# Patient Record
Sex: MALE | Race: WHITE | NOT HISPANIC OR LATINO | Employment: OTHER | URBAN - METROPOLITAN AREA
[De-identification: names, ages, dates, MRNs, and addresses within clinical notes are randomized per-mention and may not be internally consistent; named-entity substitution may affect disease eponyms.]

---

## 2023-06-28 ENCOUNTER — APPOINTMENT (OUTPATIENT)
Dept: CT IMAGING | Facility: CLINIC | Age: 83
End: 2023-06-28
Attending: EMERGENCY MEDICINE
Payer: COMMERCIAL

## 2023-06-28 ENCOUNTER — HOSPITAL ENCOUNTER (EMERGENCY)
Facility: CLINIC | Age: 83
Discharge: HOME | End: 2023-06-29
Attending: EMERGENCY MEDICINE | Admitting: EMERGENCY MEDICINE
Payer: COMMERCIAL

## 2023-06-28 DIAGNOSIS — R42 VERTIGO: ICD-10-CM

## 2023-06-28 LAB
ANION GAP SERPL CALCULATED.3IONS-SCNC: 11 MMOL/L (ref 7–15)
ATRIAL RATE - MUSE: 63 BPM
BASOPHILS # BLD AUTO: 0 10E3/UL (ref 0–0.2)
BASOPHILS NFR BLD AUTO: 0 %
BUN SERPL-MCNC: 25.8 MG/DL (ref 8–23)
CALCIUM SERPL-MCNC: 8.6 MG/DL (ref 8.8–10.2)
CHLORIDE SERPL-SCNC: 107 MMOL/L (ref 98–107)
CREAT SERPL-MCNC: 0.9 MG/DL (ref 0.67–1.17)
DEPRECATED HCO3 PLAS-SCNC: 22 MMOL/L (ref 22–29)
DIASTOLIC BLOOD PRESSURE - MUSE: NORMAL MMHG
EOSINOPHIL # BLD AUTO: 0.1 10E3/UL (ref 0–0.7)
EOSINOPHIL NFR BLD AUTO: 1 %
ERYTHROCYTE [DISTWIDTH] IN BLOOD BY AUTOMATED COUNT: 12.9 % (ref 10–15)
GFR SERPL CREATININE-BSD FRML MDRD: 86 ML/MIN/1.73M2
GLUCOSE SERPL-MCNC: 146 MG/DL (ref 70–99)
HCO3 BLDV-SCNC: 25 MMOL/L (ref 21–28)
HCT VFR BLD AUTO: 41 % (ref 40–53)
HGB BLD-MCNC: 13.4 G/DL (ref 13.3–17.7)
HOLD SPECIMEN: NORMAL
IMM GRANULOCYTES # BLD: 0 10E3/UL
IMM GRANULOCYTES NFR BLD: 0 %
INTERPRETATION ECG - MUSE: NORMAL
LACTATE BLD-SCNC: 1.5 MMOL/L
LYMPHOCYTES # BLD AUTO: 1.7 10E3/UL (ref 0.8–5.3)
LYMPHOCYTES NFR BLD AUTO: 20 %
MCH RBC QN AUTO: 31.6 PG (ref 26.5–33)
MCHC RBC AUTO-ENTMCNC: 32.7 G/DL (ref 31.5–36.5)
MCV RBC AUTO: 97 FL (ref 78–100)
MONOCYTES # BLD AUTO: 0.9 10E3/UL (ref 0–1.3)
MONOCYTES NFR BLD AUTO: 10 %
NEUTROPHILS # BLD AUTO: 5.8 10E3/UL (ref 1.6–8.3)
NEUTROPHILS NFR BLD AUTO: 69 %
NRBC # BLD AUTO: 0 10E3/UL
NRBC BLD AUTO-RTO: 0 /100
P AXIS - MUSE: 66 DEGREES
PCO2 BLDV: 41 MM HG (ref 40–50)
PH BLDV: 7.39 [PH] (ref 7.32–7.43)
PLATELET # BLD AUTO: 180 10E3/UL (ref 150–450)
PO2 BLDV: 37 MM HG (ref 25–47)
POTASSIUM SERPL-SCNC: 4.1 MMOL/L (ref 3.4–5.3)
PR INTERVAL - MUSE: 218 MS
QRS DURATION - MUSE: 96 MS
QT - MUSE: 430 MS
QTC - MUSE: 440 MS
R AXIS - MUSE: 53 DEGREES
RBC # BLD AUTO: 4.24 10E6/UL (ref 4.4–5.9)
SAO2 % BLDV: 69 % (ref 94–100)
SODIUM SERPL-SCNC: 140 MMOL/L (ref 136–145)
SYSTOLIC BLOOD PRESSURE - MUSE: NORMAL MMHG
T AXIS - MUSE: 73 DEGREES
TROPONIN T SERPL HS-MCNC: 11 NG/L
VENTRICULAR RATE- MUSE: 63 BPM
WBC # BLD AUTO: 8.5 10E3/UL (ref 4–11)

## 2023-06-28 PROCEDURE — 83605 ASSAY OF LACTIC ACID: CPT

## 2023-06-28 PROCEDURE — 250N000009 HC RX 250: Performed by: EMERGENCY MEDICINE

## 2023-06-28 PROCEDURE — 250N000011 HC RX IP 250 OP 636: Performed by: EMERGENCY MEDICINE

## 2023-06-28 PROCEDURE — 70450 CT HEAD/BRAIN W/O DYE: CPT | Mod: XS

## 2023-06-28 PROCEDURE — 82310 ASSAY OF CALCIUM: CPT | Performed by: EMERGENCY MEDICINE

## 2023-06-28 PROCEDURE — 85025 COMPLETE CBC W/AUTO DIFF WBC: CPT | Performed by: EMERGENCY MEDICINE

## 2023-06-28 PROCEDURE — 250N000013 HC RX MED GY IP 250 OP 250 PS 637: Performed by: EMERGENCY MEDICINE

## 2023-06-28 PROCEDURE — 36415 COLL VENOUS BLD VENIPUNCTURE: CPT | Performed by: EMERGENCY MEDICINE

## 2023-06-28 PROCEDURE — 93005 ELECTROCARDIOGRAM TRACING: CPT

## 2023-06-28 PROCEDURE — 70498 CT ANGIOGRAPHY NECK: CPT

## 2023-06-28 PROCEDURE — 84484 ASSAY OF TROPONIN QUANT: CPT | Performed by: EMERGENCY MEDICINE

## 2023-06-28 PROCEDURE — 99285 EMERGENCY DEPT VISIT HI MDM: CPT | Mod: 25

## 2023-06-28 PROCEDURE — 70496 CT ANGIOGRAPHY HEAD: CPT

## 2023-06-28 RX ORDER — MECLIZINE HYDROCHLORIDE 25 MG/1
25 TABLET ORAL ONCE
Status: COMPLETED | OUTPATIENT
Start: 2023-06-28 | End: 2023-06-28

## 2023-06-28 RX ORDER — ONDANSETRON 4 MG/1
4 TABLET, ORALLY DISINTEGRATING ORAL EVERY 6 HOURS PRN
Qty: 10 TABLET | Refills: 0 | Status: SHIPPED | OUTPATIENT
Start: 2023-06-28 | End: 2023-06-29

## 2023-06-28 RX ORDER — MECLIZINE HYDROCHLORIDE 25 MG/1
25 TABLET ORAL 3 TIMES DAILY PRN
Qty: 20 TABLET | Refills: 0 | Status: SHIPPED | OUTPATIENT
Start: 2023-06-28 | End: 2023-06-29

## 2023-06-28 RX ORDER — IOPAMIDOL 755 MG/ML
75 INJECTION, SOLUTION INTRAVASCULAR ONCE
Status: COMPLETED | OUTPATIENT
Start: 2023-06-28 | End: 2023-06-28

## 2023-06-28 RX ADMIN — SODIUM CHLORIDE 90 ML: 9 INJECTION, SOLUTION INTRAVENOUS at 20:53

## 2023-06-28 RX ADMIN — MECLIZINE HYDROCHLORIDE 25 MG: 25 TABLET ORAL at 22:05

## 2023-06-28 RX ADMIN — IOPAMIDOL 75 ML: 755 INJECTION, SOLUTION INTRAVENOUS at 20:53

## 2023-06-28 ASSESSMENT — ACTIVITIES OF DAILY LIVING (ADL)
ADLS_ACUITY_SCORE: 37
ADLS_ACUITY_SCORE: 37
ADLS_ACUITY_SCORE: 35

## 2023-06-28 NOTE — ED NOTES
Bed: ED10  Expected date: 6/28/23  Expected time:   Means of arrival:   Comments:  422  83 M dizzy/nausea/vomiting  4037

## 2023-06-28 NOTE — ED TRIAGE NOTES
Patient was at home and went to get the mail. Started feeling dizzy and having nausea and vomiting. Felt unable to stand. EMS was called and brought him in. Hypertensive in the rig. Was given 2.5 of droperidol.

## 2023-06-29 ENCOUNTER — APPOINTMENT (OUTPATIENT)
Dept: MRI IMAGING | Facility: CLINIC | Age: 83
End: 2023-06-29
Attending: EMERGENCY MEDICINE
Payer: COMMERCIAL

## 2023-06-29 VITALS
TEMPERATURE: 97.7 F | OXYGEN SATURATION: 96 % | BODY MASS INDEX: 23.14 KG/M2 | HEART RATE: 63 BPM | SYSTOLIC BLOOD PRESSURE: 152 MMHG | WEIGHT: 200 LBS | RESPIRATION RATE: 16 BRPM | DIASTOLIC BLOOD PRESSURE: 80 MMHG | HEIGHT: 78 IN

## 2023-06-29 PROCEDURE — A9585 GADOBUTROL INJECTION: HCPCS | Performed by: EMERGENCY MEDICINE

## 2023-06-29 PROCEDURE — 255N000002 HC RX 255 OP 636: Performed by: EMERGENCY MEDICINE

## 2023-06-29 PROCEDURE — 70553 MRI BRAIN STEM W/O & W/DYE: CPT

## 2023-06-29 RX ORDER — ONDANSETRON 4 MG/1
4 TABLET, ORALLY DISINTEGRATING ORAL EVERY 6 HOURS PRN
Qty: 10 TABLET | Refills: 0 | Status: SHIPPED | OUTPATIENT
Start: 2023-06-29 | End: 2023-10-09

## 2023-06-29 RX ORDER — MECLIZINE HYDROCHLORIDE 25 MG/1
25 TABLET ORAL 3 TIMES DAILY PRN
Qty: 20 TABLET | Refills: 0 | Status: SHIPPED | OUTPATIENT
Start: 2023-06-29 | End: 2023-10-09

## 2023-06-29 RX ORDER — GADOBUTROL 604.72 MG/ML
9 INJECTION INTRAVENOUS ONCE
Status: COMPLETED | OUTPATIENT
Start: 2023-06-29 | End: 2023-06-29

## 2023-06-29 RX ADMIN — GADOBUTROL 9 ML: 604.72 INJECTION INTRAVENOUS at 00:51

## 2023-06-29 ASSESSMENT — ACTIVITIES OF DAILY LIVING (ADL): ADLS_ACUITY_SCORE: 37

## 2023-06-29 NOTE — ED PROVIDER NOTES
"  History     Chief Complaint:  Nausea, Vomiting, & Diarrhea and Generalized Weakness       The history is provided by the patient and a relative.      Donald Severson is a 83 year old male on Xarelto who presents with nausea and vertigo. The patient reports that today around 1700 he suddenly developed dizziness. He states that he has never experienced similar dizziness. He experiences some nausea associated with this. He did take his dose of Xarelto yesterday evening. He has not had any doses of Xarelto today. He denies any recent falls or head injuries. He denies having had any alcoholic beverages recently. He states that occasionally he experiences pains in his chest but has not experienced any chest pain today. He denies experiencing fever, cough, or abdominal pain. He lives with his wife. He walks independently.    Independent Historian:   Daughter - They report that he occasionally experiences dizziness after standing or sitting up quickly. She notes that earlier today he complained of paresthesias in his toes.    Review of External Notes:   N/A    Medications:        Past Medical History:    Diverticulosis  Ureteral stone   A-fib  Narcolepsy   Prediabetes  Prepatellar bursitis   Glaucoma   Anemia   Basal cell carcinoma    Past Surgical History:  Prostatectomy   Hernia repair (x2)  Foot surgery  Hemorrhoidectomy  Foot surgery  Hernia repair   Prostate surgery  Vasectomy     Physical Exam     Patient Vitals for the past 24 hrs:   BP Temp Temp src Pulse Resp SpO2 Height Weight   06/28/23 2200 (!) 152/72 -- -- 67 15 96 % -- --   06/28/23 2015 (!) 158/70 -- -- 69 -- -- -- --   06/28/23 2005 -- -- -- -- -- -- 1.981 m (6' 6\") 90.7 kg (200 lb)   06/28/23 2004 -- -- -- 96 18 97 % -- --   06/28/23 2000 (!) 157/75 -- -- 57 -- -- -- --   06/28/23 1855 (!) 173/57 97.7  F (36.5  C) Oral -- -- -- -- --        Physical Exam  General: Alert and cooperative with exam. Patient in moderate distress.  Baseline mentation; history " of dementia.  Sitting in bed with eyes closed  Head:  Scalp is NC/AT  Eyes:  No scleral icterus, PERRL, EOMI   ENT:  The external nose and ears are normal. The oropharynx is normal and without erythema; mucus membranes are moist.   Neck:  Normal range of motion without rigidity.  CV:  Regular rate and rhythm  Resp:  Breath sounds are clear bilaterally    Non-labored, no retractions or accessory muscle use  GI:  Abdomen is soft, no distension, no tenderness. No peritoneal signs  MS:  No lower extremity edema   Skin:  Warm and dry, No rash or lesions noted.  Neuro: Oriented and alert no gross motor deficits.    Strength and sensation grossly intact in all 4 extremities.      Cranial nerves 2-12 intact.    GCS: 15     Emergency Department Course   ECG  ECG taken at 1919, ECG read at 1922  Sinus rhythm with 1st degree AV block.   Rate 63 bpm. MN interval 218 ms. QRS duration 96 ms. QT/QTc 430/440 ms. P-R-T axes 66 53 73.     Imaging:  CTA Head Neck with Contrast   Final Result   IMPRESSION:       HEAD CTA:    No high-grade proximal arterial stenosis, aneurysm, or high flow vascular malformation identified.         NECK CTA:   1.  No hemodynamically significant stenosis in the neck vessels.    2.  No evidence for dissection.      Head CT w/o contrast   Final Result   IMPRESSION:   1.  No definite CT findings of acute intracranial process.   2.  No acute intracranial hemorrhage, extra-axial fluid collection, or mass effect identified.   3.  Mild to moderate supratentorial ventriculomegaly disproportionate to degree of sulcal prominence, as described. Please see the body of the report for details.      MR Brain w/o & w Contrast    (Results Pending)      Report per radiology    Laboratory:  Labs Ordered and Resulted from Time of ED Arrival to Time of ED Departure   BASIC METABOLIC PANEL - Abnormal       Result Value    Sodium 140      Potassium 4.1      Chloride 107      Carbon Dioxide (CO2) 22      Anion Gap 11      Urea  Nitrogen 25.8 (*)     Creatinine 0.90      Calcium 8.6 (*)     Glucose 146 (*)     GFR Estimate 86     CBC WITH PLATELETS AND DIFFERENTIAL - Abnormal    WBC Count 8.5      RBC Count 4.24 (*)     Hemoglobin 13.4      Hematocrit 41.0      MCV 97      MCH 31.6      MCHC 32.7      RDW 12.9      Platelet Count 180      % Neutrophils 69      % Lymphocytes 20      % Monocytes 10      % Eosinophils 1      % Basophils 0      % Immature Granulocytes 0      NRBCs per 100 WBC 0      Absolute Neutrophils 5.8      Absolute Lymphocytes 1.7      Absolute Monocytes 0.9      Absolute Eosinophils 0.1      Absolute Basophils 0.0      Absolute Immature Granulocytes 0.0      Absolute NRBCs 0.0     ISTAT GASES LACTATE VENOUS POCT - Abnormal    Lactic Acid POCT 1.5      Bicarbonate Venous POCT 25      O2 Sat, Venous POCT 69 (*)     pCO2 Venous POCT 41      pH Venous POCT 7.39      pO2 Venous POCT 37     TROPONIN T, HIGH SENSITIVITY - Normal    Troponin T, High Sensitivity 11          Emergency Department Course & Assessments:    Interventions:  Medications   iopamidol (ISOVUE-370) solution 75 mL (75 mLs Intravenous $Given 6/28/23 2053)   Saline Flush (90 mLs Intravenous $Given 6/28/23 2053)   meclizine (ANTIVERT) tablet 25 mg (25 mg Oral $Given 6/28/23 2205)        Assessments:  1908 I obtained history and examined the patient.  2154 I rechecked the patient.    Independent Interpretation (X-rays, CTs, rhythm strip):  None    Consultations/Discussion of Management or Tests:  None     Social Determinants of Health affecting care:   None    Disposition:  Care of the patient was transferred to my colleague Dr. Londono pending MRI.     Impression & Plan      Medical Decision Making:  Donald L Severson is a 83 year old male who presents for evaluation of vertigo. The differential diagnosis of vertigo is broad and includes common etiologies such as menieres disease, labyrinthitis, benign positional vertigo, otitis media, etc.  More serious  etiologies considered include central etiologies such as tumor, intracerebral bleed, dissection, ischemic cerebral vascular accident.  Patient was noted to be significantly hypertensive on initial assessment with significant vertigo.  He underwent CT/CTA of the head and neck given anticoagulated status and concern for potential central causes of vertigo; this was unremarkable for acute pathology as noted above.  MRI brain currently pending.  On reevaluation patient does note substantial improvement with interventions as above and clinically appears well.  Labs and EKG without significant acute findings.  If MRI negative and patient's symptoms are well controlled then I feel he can safely be discharged with close outpatient follow-up; prescriptions written for meclizine and Zofran for symptomatic relief.  Patient signed out to my partner Dr. Londono pending MRI results.      Diagnosis:    ICD-10-CM    1. Vertigo  R42              Scribe Disclosure:  Joce JACKSON B.S. am serving as a scribe at 7:18 PM on 6/28/2023 to document services personally performed by Benjamín Thrasher DO based on my observations and the provider's statements to me.        Benjamín Thrasher DO  06/29/23 0007

## 2023-06-29 NOTE — ED PROVIDER NOTES
Assumed care of the patient from Dr. Thrasher pending return of an MRI.  Results are noted below and are unremarkable.  Patient was feeling well and was subsequently discharged home.    MR Brain w/o & w Contrast   Final Result   IMPRESSION:   1.  No acute infarct.    2.  Chronic intracranial changes described above.         CTA Head Neck with Contrast   Final Result   IMPRESSION:       HEAD CTA:    No high-grade proximal arterial stenosis, aneurysm, or high flow vascular malformation identified.         NECK CTA:   1.  No hemodynamically significant stenosis in the neck vessels.    2.  No evidence for dissection.      Head CT w/o contrast   Final Result   IMPRESSION:   1.  No definite CT findings of acute intracranial process.   2.  No acute intracranial hemorrhage, extra-axial fluid collection, or mass effect identified.   3.  Mild to moderate supratentorial ventriculomegaly disproportionate to degree of sulcal prominence, as described. Please see the body of the report for details.           Jere Londono MD  06/29/23 0114

## 2023-10-09 ENCOUNTER — APPOINTMENT (OUTPATIENT)
Dept: CT IMAGING | Facility: CLINIC | Age: 83
End: 2023-10-09
Attending: EMERGENCY MEDICINE
Payer: COMMERCIAL

## 2023-10-09 ENCOUNTER — APPOINTMENT (OUTPATIENT)
Dept: GENERAL RADIOLOGY | Facility: CLINIC | Age: 83
End: 2023-10-09
Attending: EMERGENCY MEDICINE
Payer: COMMERCIAL

## 2023-10-09 ENCOUNTER — HOSPITAL ENCOUNTER (OUTPATIENT)
Facility: CLINIC | Age: 83
Setting detail: OBSERVATION
Discharge: NURSING, REHAB OR LONG-TERM CARE | End: 2023-10-12
Attending: EMERGENCY MEDICINE | Admitting: INTERNAL MEDICINE
Payer: COMMERCIAL

## 2023-10-09 DIAGNOSIS — R53.1 WEAKNESS: ICD-10-CM

## 2023-10-09 DIAGNOSIS — G47.419 PRIMARY NARCOLEPSY WITHOUT CATAPLEXY: ICD-10-CM

## 2023-10-09 DIAGNOSIS — U07.1 COVID-19: ICD-10-CM

## 2023-10-09 DIAGNOSIS — R19.5 LOOSE STOOLS: Primary | ICD-10-CM

## 2023-10-09 DIAGNOSIS — R41.82 ALTERED MENTAL STATUS, UNSPECIFIED ALTERED MENTAL STATUS TYPE: ICD-10-CM

## 2023-10-09 LAB
ALBUMIN UR-MCNC: 10 MG/DL
ANION GAP SERPL CALCULATED.3IONS-SCNC: 11 MMOL/L (ref 7–15)
APPEARANCE UR: CLEAR
APTT PPP: 28 SECONDS (ref 22–38)
ATRIAL RATE - MUSE: 75 BPM
BACTERIA #/AREA URNS HPF: ABNORMAL /HPF
BASO+EOS+MONOS # BLD AUTO: ABNORMAL 10*3/UL
BASO+EOS+MONOS NFR BLD AUTO: ABNORMAL %
BASOPHILS # BLD AUTO: 0 10E3/UL (ref 0–0.2)
BASOPHILS NFR BLD AUTO: 0 %
BILIRUB UR QL STRIP: NEGATIVE
BUN SERPL-MCNC: 24.6 MG/DL (ref 8–23)
CALCIUM SERPL-MCNC: 8.7 MG/DL (ref 8.8–10.2)
CHLORIDE SERPL-SCNC: 103 MMOL/L (ref 98–107)
COLOR UR AUTO: ABNORMAL
CREAT SERPL-MCNC: 0.99 MG/DL (ref 0.67–1.17)
CRP SERPL-MCNC: 11.94 MG/L
D DIMER PPP FEU-MCNC: 1.04 UG/ML FEU (ref 0–0.5)
DEPRECATED HCO3 PLAS-SCNC: 23 MMOL/L (ref 22–29)
DIASTOLIC BLOOD PRESSURE - MUSE: NORMAL MMHG
EGFRCR SERPLBLD CKD-EPI 2021: 77 ML/MIN/1.73M2
EOSINOPHIL # BLD AUTO: 0 10E3/UL (ref 0–0.7)
EOSINOPHIL NFR BLD AUTO: 0 %
ERYTHROCYTE [DISTWIDTH] IN BLOOD BY AUTOMATED COUNT: 12.3 % (ref 10–15)
FLUAV RNA SPEC QL NAA+PROBE: NEGATIVE
FLUBV RNA RESP QL NAA+PROBE: NEGATIVE
GLUCOSE SERPL-MCNC: 111 MG/DL (ref 70–99)
GLUCOSE UR STRIP-MCNC: NEGATIVE MG/DL
HCT VFR BLD AUTO: 39.2 % (ref 40–53)
HGB BLD-MCNC: 12.6 G/DL (ref 13.3–17.7)
HGB UR QL STRIP: NEGATIVE
IMM GRANULOCYTES # BLD: 0 10E3/UL
IMM GRANULOCYTES NFR BLD: 0 %
INR PPP: 1.2 (ref 0.85–1.15)
INTERPRETATION ECG - MUSE: NORMAL
KETONES UR STRIP-MCNC: NEGATIVE MG/DL
LACTATE SERPL-SCNC: 1.1 MMOL/L (ref 0.7–2)
LEUKOCYTE ESTERASE UR QL STRIP: NEGATIVE
LYMPHOCYTES # BLD AUTO: 0.9 10E3/UL (ref 0.8–5.3)
LYMPHOCYTES NFR BLD AUTO: 14 %
MCH RBC QN AUTO: 30.7 PG (ref 26.5–33)
MCHC RBC AUTO-ENTMCNC: 32.1 G/DL (ref 31.5–36.5)
MCV RBC AUTO: 95 FL (ref 78–100)
MONOCYTES # BLD AUTO: 1.3 10E3/UL (ref 0–1.3)
MONOCYTES NFR BLD AUTO: 20 %
NEUTROPHILS # BLD AUTO: 4.1 10E3/UL (ref 1.6–8.3)
NEUTROPHILS NFR BLD AUTO: 66 %
NITRATE UR QL: NEGATIVE
NRBC # BLD AUTO: 0 10E3/UL
NRBC BLD AUTO-RTO: 0 /100
P AXIS - MUSE: 63 DEGREES
PH UR STRIP: 7.5 [PH] (ref 5–7)
PLATELET # BLD AUTO: 228 10E3/UL (ref 150–450)
POTASSIUM SERPL-SCNC: 4.2 MMOL/L (ref 3.4–5.3)
PR INTERVAL - MUSE: 218 MS
QRS DURATION - MUSE: 88 MS
QT - MUSE: 390 MS
QTC - MUSE: 435 MS
R AXIS - MUSE: 60 DEGREES
RBC # BLD AUTO: 4.11 10E6/UL (ref 4.4–5.9)
RBC URINE: <1 /HPF
RSV RNA SPEC NAA+PROBE: NEGATIVE
SARS-COV-2 RNA RESP QL NAA+PROBE: POSITIVE
SODIUM SERPL-SCNC: 137 MMOL/L (ref 135–145)
SP GR UR STRIP: 1.01 (ref 1–1.03)
SYSTOLIC BLOOD PRESSURE - MUSE: NORMAL MMHG
T AXIS - MUSE: 74 DEGREES
TROPONIN T SERPL HS-MCNC: 13 NG/L
UFH PPP CHRO-ACNC: <0.1 IU/ML
UROBILINOGEN UR STRIP-MCNC: NORMAL MG/DL
VENTRICULAR RATE- MUSE: 75 BPM
WBC # BLD AUTO: 6.3 10E3/UL (ref 4–11)
WBC URINE: <1 /HPF

## 2023-10-09 PROCEDURE — 250N000009 HC RX 250: Performed by: EMERGENCY MEDICINE

## 2023-10-09 PROCEDURE — 85730 THROMBOPLASTIN TIME PARTIAL: CPT | Performed by: EMERGENCY MEDICINE

## 2023-10-09 PROCEDURE — 93005 ELECTROCARDIOGRAM TRACING: CPT

## 2023-10-09 PROCEDURE — 70496 CT ANGIOGRAPHY HEAD: CPT

## 2023-10-09 PROCEDURE — 81001 URINALYSIS AUTO W/SCOPE: CPT | Performed by: EMERGENCY MEDICINE

## 2023-10-09 PROCEDURE — 99285 EMERGENCY DEPT VISIT HI MDM: CPT | Mod: 25

## 2023-10-09 PROCEDURE — 85025 COMPLETE CBC W/AUTO DIFF WBC: CPT | Performed by: EMERGENCY MEDICINE

## 2023-10-09 PROCEDURE — 70450 CT HEAD/BRAIN W/O DYE: CPT

## 2023-10-09 PROCEDURE — 87637 SARSCOV2&INF A&B&RSV AMP PRB: CPT | Performed by: EMERGENCY MEDICINE

## 2023-10-09 PROCEDURE — 120N000001 HC R&B MED SURG/OB

## 2023-10-09 PROCEDURE — 71046 X-RAY EXAM CHEST 2 VIEWS: CPT

## 2023-10-09 PROCEDURE — 82310 ASSAY OF CALCIUM: CPT | Performed by: EMERGENCY MEDICINE

## 2023-10-09 PROCEDURE — 36415 COLL VENOUS BLD VENIPUNCTURE: CPT | Performed by: EMERGENCY MEDICINE

## 2023-10-09 PROCEDURE — 85610 PROTHROMBIN TIME: CPT | Performed by: EMERGENCY MEDICINE

## 2023-10-09 PROCEDURE — 85379 FIBRIN DEGRADATION QUANT: CPT | Performed by: INTERNAL MEDICINE

## 2023-10-09 PROCEDURE — 87086 URINE CULTURE/COLONY COUNT: CPT | Performed by: EMERGENCY MEDICINE

## 2023-10-09 PROCEDURE — 85520 HEPARIN ASSAY: CPT | Performed by: EMERGENCY MEDICINE

## 2023-10-09 PROCEDURE — 70498 CT ANGIOGRAPHY NECK: CPT

## 2023-10-09 PROCEDURE — 83605 ASSAY OF LACTIC ACID: CPT | Performed by: EMERGENCY MEDICINE

## 2023-10-09 PROCEDURE — 250N000011 HC RX IP 250 OP 636: Performed by: EMERGENCY MEDICINE

## 2023-10-09 PROCEDURE — 87040 BLOOD CULTURE FOR BACTERIA: CPT | Performed by: EMERGENCY MEDICINE

## 2023-10-09 PROCEDURE — 99222 1ST HOSP IP/OBS MODERATE 55: CPT | Performed by: INTERNAL MEDICINE

## 2023-10-09 PROCEDURE — 86140 C-REACTIVE PROTEIN: CPT | Performed by: INTERNAL MEDICINE

## 2023-10-09 PROCEDURE — 84484 ASSAY OF TROPONIN QUANT: CPT | Performed by: EMERGENCY MEDICINE

## 2023-10-09 RX ORDER — LATANOPROST 50 UG/ML
1 SOLUTION/ DROPS OPHTHALMIC AT BEDTIME
COMMUNITY

## 2023-10-09 RX ORDER — MODAFINIL 200 MG/1
200 TABLET ORAL DAILY PRN
COMMUNITY
End: 2023-10-13

## 2023-10-09 RX ORDER — TIMOLOL MALEATE 5 MG/ML
1 SOLUTION/ DROPS OPHTHALMIC DAILY
COMMUNITY

## 2023-10-09 RX ORDER — IOPAMIDOL 755 MG/ML
67 INJECTION, SOLUTION INTRAVASCULAR ONCE
Status: COMPLETED | OUTPATIENT
Start: 2023-10-09 | End: 2023-10-09

## 2023-10-09 RX ORDER — MODAFINIL 200 MG/1
200 TABLET ORAL DAILY
COMMUNITY
End: 2023-10-13

## 2023-10-09 RX ORDER — DONEPEZIL HYDROCHLORIDE 5 MG/1
10 TABLET, FILM COATED ORAL AT BEDTIME
COMMUNITY

## 2023-10-09 RX ADMIN — IOPAMIDOL 67 ML: 755 INJECTION, SOLUTION INTRAVENOUS at 17:43

## 2023-10-09 RX ADMIN — SODIUM CHLORIDE 100 ML: 9 INJECTION, SOLUTION INTRAVENOUS at 17:44

## 2023-10-09 ASSESSMENT — ACTIVITIES OF DAILY LIVING (ADL)
ADLS_ACUITY_SCORE: 35

## 2023-10-09 NOTE — ED TRIAGE NOTES
pt went to [employer] around 1330 today and he was found confused at 1630 in the parking lot and he was confused

## 2023-10-09 NOTE — CONSULTS
"      [facility]    Stroke Consult Note    Reason for Consult: Stroke Code     Chief Complaint: No chief complaint on file.      HPI  Donald L Severson is a 83 year old male with a history of atrial fibrillation on Xarelto(not taking it for the last for 5 days) and mild cognitive impairment presents to the ED after he had an episode of confusion where he seemed confused as per the daughter who checked on him in the store.  As per the daughter, he went to [employer] around 1:30 PM today on 10/9/2023 and 3 hours later she checked on him and that people and he seemed confused was brought to the ED.  His speech was different as per the daughter but significantly improved after being brought to the ED.      Imaging Findings  CT head-no intracranial hemorrhage or large ischemic infarct  CTA head and neck-no large vessel occlusion    Intravenous Thrombolysis  Not given due to:   - resolution of symptoms and out of window    Endovascular Treatment  Not initiated due to absence of proximal vessel occlusion    Impression   #Encephalopathy    Recommendations  -Recommend MRI brain with and without contrast to rule out any evidence of ischemic stroke  -Work-up for systemic causes of encephalopathy including infection and metabolic causes.    Patient Follow-up     - final recommendation pending work-up    Thank you for this consult. We will continue to follow.      The Stroke Staff is TYRELL Staton MD  Vascular Neurology Fellow    To page me or covering stroke neurology team member, click here: [facility]  Choose \"On Call\" tab at top, then select \"NEUROLOGY/ALL SITES\" from middle drop-down box, press Enter, then look for \"stroke\" or \"telestroke\" for your site.  ______________________________________________________    Clinically Significant Risk Factors Present on Admission                                  Past Medical History   No past medical history on file.  Past " Surgical History   No past surgical history on file.  Medications   Home Meds  Prior to Admission medications    Medication Sig Start Date End Date Taking? Authorizing Provider   meclizine (ANTIVERT) 25 MG tablet Take 1 tablet (25 mg) by mouth 3 times daily as needed for dizziness 6/29/23   Trigger, Jere Catherine MD   ondansetron (ZOFRAN ODT) 4 MG ODT tab Take 1 tablet (4 mg) by mouth every 6 hours as needed for nausea 6/29/23   Trigger, Jere Catherine MD       Scheduled Meds      Infusion Meds      PRN Meds      Allergies   No Known Allergies  Family History   No family history on file.  Social History        Review of Systems   Review of systems was not needed on this patient       PHYSICAL EXAMINATION  Temp:  [101.2  F (38.4  C)] 101.2  F (38.4  C)  Pulse:  [74] 74  Resp:  [18] 18  BP: (154)/(58) 154/58  SpO2:  [96 %] 96 %     Neurologic  Mental Status:  alert, oriented x 3, follows commands, speech clear and fluent, naming and repetition normal  Cranial Nerves:  visual fields intact, PERRL, EOMI with normal smooth pursuit, facial sensation intact and symmetric, facial movements symmetric, hearing not formally tested but intact to conversation, palate elevation symmetric and uvula midline, no dysarthria, shoulder shrug strong bilaterally, tongue protrusion midline  Motor:  normal muscle tone and bulk, no abnormal movements, able to move all limbs spontaneously, strength 5/5 throughout upper and lower extremities, no pronator drift  Reflexes:  toes down-going  Sensory:  light touch sensation intact and symmetric throughout upper and lower extremities, no extinction on double simultaneous stimulation   Coordination:  normal finger-to-nose and heel-to-shin bilaterally without dysmetria, rapid alternating movements symmetric  Station/Gait:  deferred    Dysphagia Screen  Per Nursing    Stroke Scales    NIHSS  1a. Level of Consciousness 0-->Alert, keenly responsive   1b. LOC Questions 0-->Answers both questions  correctly   1c. LOC Commands 0-->Performs both tasks correctly   2.   Best Gaze 0-->Normal   3.   Visual 0-->No visual loss   4.   Facial Palsy 0-->Normal symmetrical movements   5a. Motor Arm, Left 0-->No drift, limb holds 90 (or 45) degrees for full 10 secs   5b. Motor Arm, Right 0-->No drift, limb holds 90 (or 45) degrees for full 10 secs   6a. Motor Leg, Left 0-->No drift, leg holds 30 degree position for full 5 secs   6b. Motor Leg, right 0-->No drift, leg holds 30 degree position for full 5 secs   7.   Limb Ataxia 0-->Absent   8.   Sensory 0-->Normal, no sensory loss   9.   Best Language 0-->No aphasia, normal   10. Dysarthria 0-->Normal   11. Extinction and Inattention  0-->No abnormality   Total 0 (10/09/23 1749)       Modified Whitfield Score (Pre-morbid)    -      Imaging  I personally reviewed all imaging; relevant findings per HPI.     Lab Results Data   CBC  No results for input(s): WBC, RBC, HGB, HCT, PLT in the last 168 hours.  Basic Metabolic Panel    No results for input(s): NA, POTASSIUM, CHLORIDE, CO2, BUN, CR, GLC, RAJENDRA in the last 168 hours.  Liver Panel  No results for input(s): PROTTOTAL, ALBUMIN, BILITOTAL, ALKPHOS, AST, ALT, BILIDIRECT in the last 168 hours.  INR  No lab results found.   Lipid Profile  No lab results found.  A1C  No lab results found.  Troponin  No results for input(s): CTROPT, TROPONINIS, TROPONINI, GHTROP in the last 168 hours.       Stroke Code Data Data   Stroke Code Data  (for stroke code without tele)  Stroke code activated 10/09/23   1735   First stroke provider response         Last known normal 10/09/23   1330   Time of discovery   (or onset of symptoms) 10/09/23   1630   Head CT read by Stroke Neuro Dr/Provider 10/09/23   1754   Was stroke code de-escalated? Yes 10/09/23 1800

## 2023-10-09 NOTE — ED PROVIDER NOTES
"  History     Chief Complaint:  Extremity Weakness    HPI   Donald L Severson is a 83 year old male with a history of glaucoma, basal cell skin infection, atrial fibrillation who presents to the ED via EMS for an evaluation of extremity weakness. Patient went to [employer] at around 1330 today, his family noticed he was gone for a while and was eventually found in the parking lot inside his car confused and unable to speak. Daughter reports the patient recently stopped taking Xarelto because of hematoma. Family noticed his right leg was swollen about 3 weeks ago. He went to the clinic two different times to get it checked because they thought it was clotting. Daughter has been aware of his knee since then. Patient has fever of 101.2 after coming into the ED, before he had no fever and cough.     Independent Historian:   EMS - They report as noted above and Daughter - They report as noted above.     Review of External Notes:   I reviewed a note from [facility] from 10/4/23.     Medications:    Meclizine   Ondansetron  Loperamide HCI  Cholecalciferol   Rivaroxaban   Timolol   Donepezil   Latanoprost     Past Medical History:    Malignant neoplasm of prostate   Glaucoma   Right knee infection   Basal cell skin cancer   Cataract   Atrial fibrillation     Past Surgical History:    Radical prostatectomy   Inguinal hernia repair bilateral   Achilles heel left ankle   Hemorrhoidectomy   Prostate removed   Vasectomy     Physical Exam   Patient Vitals for the past 24 hrs:   BP Temp Temp src Pulse Resp SpO2 Height Weight   10/09/23 2034 (!) 142/86 -- -- 66 18 98 % -- --   10/09/23 1915 (!) 154/62 -- -- 72 16 96 % -- --   10/09/23 1821 -- -- -- -- -- -- 1.981 m (6' 6\") 84.2 kg (185 lb 10 oz)   10/09/23 1815 (!) 149/64 -- -- 76 28 97 % -- --   10/09/23 1800 (!) 152/66 -- -- 81 22 98 % -- --   10/09/23 1739 -- -- -- -- -- -- -- 84.2 kg (185 lb 10 oz)   10/09/23 1736 (!) 154/58 (!) 101.2  F (38.4  C) Temporal 74 18 " 96 % -- 84.2 kg (185 lb 10 oz)      Physical Exam  General: Alert, No distress. Nontoxic appearance  Head: No signs of trauma.   Mouth/Throat: Oropharynx moist.   Eyes: Conjunctivae are normal. Pupils are equal..   Neck: Normal range of motion.    CV: Appears well perfused.  Resp: No respiratory distress.   MSK: Normal range of motion. No obvious deformity.   Neuro: The patient is alert and interactive. JUSTICE. Speech normal. GCS 15.  Slight right-sided facial weakness.  Slight right-sided upper extremity decrease in coordination with finger-nose-finger.  Skin: No lesion or sign of trauma noted.   Psych: normal mood and affect. behavior is normal.      Emergency Department Course   ECG  ECG taken at 1816, ECG read at 1823  Sinus rhythm with 1st degree AV block   Otherwise normal ECG    as compared to prior, dated 06/28/23.  Rate 75 bpm. CA interval 218 ms. QRS duration 88 ms. QT/QTc 390/435 ms. P-R-T axes 63 60 74.     Imaging:  Chest XR,  PA & LAT   Final Result   IMPRESSION: Negative chest.      CTA Head Neck with Contrast   Final Result   IMPRESSION:    HEAD CTA:    1.  No evidence of large vessel occlusion or high-grade stenosis.      NECK CTA:   1.  No evidence of large vessel occlusion or high-grade stenosis.      CT Head w/o Contrast   Final Result   IMPRESSION:   No evidence of hemorrhage. No convincing acute infarct. Volume loss and background of white matter hypoattenuation likely representing chronic small vessel ischemic change. No acute osseous abnormality. Nasal septal defect noted.      Findings were discussed by phone between Dr. Arreola and Dr. White at approximately 5:56 PM on 10/9/2023.         Report per radiology    Laboratory:  Labs Ordered and Resulted from Time of ED Arrival to Time of ED Departure   BASIC METABOLIC PANEL - Abnormal       Result Value    Sodium 137      Potassium 4.2      Chloride 103      Carbon Dioxide (CO2) 23      Anion Gap 11      Urea Nitrogen 24.6 (*)     Creatinine 0.99       GFR Estimate 77      Calcium 8.7 (*)     Glucose 111 (*)    INR - Abnormal    INR 1.20 (*)    CBC WITH PLATELETS AND DIFFERENTIAL - Abnormal    WBC Count 6.3      RBC Count 4.11 (*)     Hemoglobin 12.6 (*)     Hematocrit 39.2 (*)     MCV 95      MCH 30.7      MCHC 32.1      RDW 12.3      Platelet Count 228      % Neutrophils 66      % Lymphocytes 14      % Monocytes 20      Mids % (Monos, Eos, Basos)        % Eosinophils 0      % Basophils 0      % Immature Granulocytes 0      NRBCs per 100 WBC 0      Absolute Neutrophils 4.1      Absolute Lymphocytes 0.9      Absolute Monocytes 1.3      Mids Abs (Monos, Eos, Basos)        Absolute Eosinophils 0.0      Absolute Basophils 0.0      Absolute Immature Granulocytes 0.0      Absolute NRBCs 0.0     ROUTINE UA WITH MICROSCOPIC REFLEX TO CULTURE - Abnormal    Color Urine Light Yellow      Appearance Urine Clear      Glucose Urine Negative      Bilirubin Urine Negative      Ketones Urine Negative      Specific Gravity Urine 1.010      Blood Urine Negative      pH Urine 7.5 (*)     Protein Albumin Urine 10 (*)     Urobilinogen Urine Normal      Nitrite Urine Negative      Leukocyte Esterase Urine Negative      Bacteria Urine Few (*)     RBC Urine <1      WBC Urine <1     INFLUENZA A/B, RSV, & SARS-COV2 PCR - Abnormal    Influenza A PCR Negative      Influenza B PCR Negative      RSV PCR Negative      SARS CoV2 PCR Positive (*)    HEPARIN UNFRACTIONATED ANTI XA LEVEL - Normal    Anti Xa Unfractionated Heparin <0.10     PARTIAL THROMBOPLASTIN TIME - Normal    aPTT 28     TROPONIN T, HIGH SENSITIVITY - Normal    Troponin T, High Sensitivity 13     LACTIC ACID WHOLE BLOOD - Normal    Lactic Acid 1.1     GLUCOSE MONITOR NURSING POCT   URINE CULTURE   BLOOD CULTURE   BLOOD CULTURE      Emergency Department Course & Assessments:    Interventions:  Medications   iopamidol (ISOVUE-370) solution 67 mL (67 mLs Intravenous $Given 10/9/23 0812)   100mL Saline FLush (100 mLs As  instructed $Given 10/9/23 4223)      Assessments:  1731 I obtained history and examined the patient as noted above.   2137 I rechecked and updated the patient. Patient tested positive for COVID.  Independent Interpretation (X-rays, CTs, rhythm strip):  None    Consultations/Discussion of Management or Tests:  1819 I spoke with Dr. Hicks from Neuro.    2210 I spoke with Dr. Shai Gutierrez of the hospitalist service regarding admission.     Social Determinants of Health affecting care:   Stress/Adjustment Disorders    Disposition:  The patient was admitted to the hospital under the care of Dr. Shai Gutierrez    Impression & Plan      Medical Decision Making:  This patient is presenting to the ER with altered mental status and weakness.  Symptoms initially were starting for possible stroke.  Code stroke was called and the patient was evaluated by the stroke team.  However, during the patient's initial evaluation he was found to have a fever.  A search of possible infectious causes revealed the patient has COVID 19 viral infection.  No hypoxia.  No signs of sepsis.  Because of the patient's continued confusion and weakness/gait instability, he will be admitted to the hospital for further evaluation and treatment.    Diagnosis:    ICD-10-CM    1. COVID-19  U07.1       2. Weakness  R53.1       3. Altered mental status, unspecified altered mental status type  R41.82          Critical care time: 60 minutes     Scribe Disclosure:  I, Jeny Humphries, am serving as a scribe at 5:40 PM on 10/9/2023 to document services personally performed by Chago White MD based on my observations and the provider's statements to me.     10/9/2023   Chago White MD Joing, Todd Roger, MD  10/10/23 0048

## 2023-10-10 ENCOUNTER — APPOINTMENT (OUTPATIENT)
Dept: MRI IMAGING | Facility: CLINIC | Age: 83
End: 2023-10-10
Attending: INTERNAL MEDICINE
Payer: COMMERCIAL

## 2023-10-10 ENCOUNTER — APPOINTMENT (OUTPATIENT)
Dept: PHYSICAL THERAPY | Facility: CLINIC | Age: 83
End: 2023-10-10
Attending: INTERNAL MEDICINE
Payer: COMMERCIAL

## 2023-10-10 LAB
ERYTHROCYTE [DISTWIDTH] IN BLOOD BY AUTOMATED COUNT: 12.2 % (ref 10–15)
HCT VFR BLD AUTO: 39.7 % (ref 40–53)
HGB BLD-MCNC: 12.9 G/DL (ref 13.3–17.7)
MCH RBC QN AUTO: 31.1 PG (ref 26.5–33)
MCHC RBC AUTO-ENTMCNC: 32.5 G/DL (ref 31.5–36.5)
MCV RBC AUTO: 96 FL (ref 78–100)
PLATELET # BLD AUTO: 222 10E3/UL (ref 150–450)
RBC # BLD AUTO: 4.15 10E6/UL (ref 4.4–5.9)
WBC # BLD AUTO: 6.4 10E3/UL (ref 4–11)

## 2023-10-10 PROCEDURE — G0378 HOSPITAL OBSERVATION PER HR: HCPCS

## 2023-10-10 PROCEDURE — 85018 HEMOGLOBIN: CPT | Performed by: INTERNAL MEDICINE

## 2023-10-10 PROCEDURE — 250N000009 HC RX 250: Performed by: INTERNAL MEDICINE

## 2023-10-10 PROCEDURE — 70553 MRI BRAIN STEM W/O & W/DYE: CPT

## 2023-10-10 PROCEDURE — 97161 PT EVAL LOW COMPLEX 20 MIN: CPT | Mod: GP

## 2023-10-10 PROCEDURE — 250N000013 HC RX MED GY IP 250 OP 250 PS 637: Performed by: INTERNAL MEDICINE

## 2023-10-10 PROCEDURE — 255N000002 HC RX 255 OP 636: Performed by: INTERNAL MEDICINE

## 2023-10-10 PROCEDURE — 99232 SBSQ HOSP IP/OBS MODERATE 35: CPT | Performed by: INTERNAL MEDICINE

## 2023-10-10 PROCEDURE — 85041 AUTOMATED RBC COUNT: CPT | Performed by: INTERNAL MEDICINE

## 2023-10-10 PROCEDURE — 250N000011 HC RX IP 250 OP 636: Mod: JZ | Performed by: INTERNAL MEDICINE

## 2023-10-10 PROCEDURE — 36415 COLL VENOUS BLD VENIPUNCTURE: CPT | Performed by: INTERNAL MEDICINE

## 2023-10-10 PROCEDURE — 96372 THER/PROPH/DIAG INJ SC/IM: CPT | Mod: XU | Performed by: INTERNAL MEDICINE

## 2023-10-10 PROCEDURE — 97530 THERAPEUTIC ACTIVITIES: CPT | Mod: GP

## 2023-10-10 PROCEDURE — A9585 GADOBUTROL INJECTION: HCPCS | Performed by: INTERNAL MEDICINE

## 2023-10-10 RX ORDER — LATANOPROST 50 UG/ML
1 SOLUTION/ DROPS OPHTHALMIC AT BEDTIME
Status: DISCONTINUED | OUTPATIENT
Start: 2023-10-10 | End: 2023-10-12 | Stop reason: HOSPADM

## 2023-10-10 RX ORDER — ACETAMINOPHEN 650 MG/1
650 SUPPOSITORY RECTAL EVERY 6 HOURS PRN
Status: DISCONTINUED | OUTPATIENT
Start: 2023-10-10 | End: 2023-10-12 | Stop reason: HOSPADM

## 2023-10-10 RX ORDER — MODAFINIL 200 MG/1
200 TABLET ORAL DAILY PRN
Status: DISCONTINUED | OUTPATIENT
Start: 2023-10-10 | End: 2023-10-12 | Stop reason: HOSPADM

## 2023-10-10 RX ORDER — GADOBUTROL 604.72 MG/ML
9 INJECTION INTRAVENOUS ONCE
Status: COMPLETED | OUTPATIENT
Start: 2023-10-10 | End: 2023-10-10

## 2023-10-10 RX ORDER — AMOXICILLIN 250 MG
2 CAPSULE ORAL 2 TIMES DAILY PRN
Status: DISCONTINUED | OUTPATIENT
Start: 2023-10-10 | End: 2023-10-12 | Stop reason: HOSPADM

## 2023-10-10 RX ORDER — AMOXICILLIN 250 MG
1 CAPSULE ORAL 2 TIMES DAILY PRN
Status: DISCONTINUED | OUTPATIENT
Start: 2023-10-10 | End: 2023-10-12 | Stop reason: HOSPADM

## 2023-10-10 RX ORDER — ACETAMINOPHEN 325 MG/1
650 TABLET ORAL EVERY 6 HOURS PRN
Status: DISCONTINUED | OUTPATIENT
Start: 2023-10-10 | End: 2023-10-12 | Stop reason: HOSPADM

## 2023-10-10 RX ORDER — ONDANSETRON 4 MG/1
4 TABLET, ORALLY DISINTEGRATING ORAL EVERY 6 HOURS PRN
Status: DISCONTINUED | OUTPATIENT
Start: 2023-10-10 | End: 2023-10-12 | Stop reason: HOSPADM

## 2023-10-10 RX ORDER — DONEPEZIL HYDROCHLORIDE 5 MG/1
10 TABLET, FILM COATED ORAL AT BEDTIME
Status: DISCONTINUED | OUTPATIENT
Start: 2023-10-10 | End: 2023-10-12 | Stop reason: HOSPADM

## 2023-10-10 RX ORDER — TIMOLOL MALEATE 5 MG/ML
1 SOLUTION/ DROPS OPHTHALMIC DAILY
Status: DISCONTINUED | OUTPATIENT
Start: 2023-10-10 | End: 2023-10-12 | Stop reason: HOSPADM

## 2023-10-10 RX ORDER — POLYETHYLENE GLYCOL 3350 17 G/17G
17 POWDER, FOR SOLUTION ORAL DAILY PRN
Status: DISCONTINUED | OUTPATIENT
Start: 2023-10-10 | End: 2023-10-12 | Stop reason: HOSPADM

## 2023-10-10 RX ORDER — ONDANSETRON 2 MG/ML
4 INJECTION INTRAMUSCULAR; INTRAVENOUS EVERY 6 HOURS PRN
Status: DISCONTINUED | OUTPATIENT
Start: 2023-10-10 | End: 2023-10-12 | Stop reason: HOSPADM

## 2023-10-10 RX ORDER — ENOXAPARIN SODIUM 100 MG/ML
40 INJECTION SUBCUTANEOUS EVERY 24 HOURS
Status: DISCONTINUED | OUTPATIENT
Start: 2023-10-10 | End: 2023-10-11

## 2023-10-10 RX ORDER — MODAFINIL 200 MG/1
200 TABLET ORAL DAILY
Status: DISCONTINUED | OUTPATIENT
Start: 2023-10-10 | End: 2023-10-12 | Stop reason: HOSPADM

## 2023-10-10 RX ADMIN — ENOXAPARIN SODIUM 40 MG: 40 INJECTION SUBCUTANEOUS at 09:18

## 2023-10-10 RX ADMIN — DONEPEZIL HYDROCHLORIDE 10 MG: 5 TABLET, FILM COATED ORAL at 23:11

## 2023-10-10 RX ADMIN — MODAFINIL 200 MG: 200 TABLET ORAL at 13:00

## 2023-10-10 RX ADMIN — TIMOLOL MALEATE 1 DROP: 5 SOLUTION/ DROPS OPHTHALMIC at 13:00

## 2023-10-10 RX ADMIN — GADOBUTROL 9 ML: 604.72 INJECTION INTRAVENOUS at 19:39

## 2023-10-10 RX ADMIN — LATANOPROST 1 DROP: 50 SOLUTION/ DROPS OPHTHALMIC at 23:13

## 2023-10-10 ASSESSMENT — ACTIVITIES OF DAILY LIVING (ADL)
WEAR_GLASSES_OR_BLIND: YES
ADLS_ACUITY_SCORE: 35
ADLS_ACUITY_SCORE: 27
ADLS_ACUITY_SCORE: 35
CONCENTRATING,_REMEMBERING_OR_MAKING_DECISIONS_DIFFICULTY: YES
ADLS_ACUITY_SCORE: 27
ADLS_ACUITY_SCORE: 35
ADLS_ACUITY_SCORE: 35
NUMBER_OF_TIMES_PATIENT_HAS_FALLEN_WITHIN_LAST_SIX_MONTHS: 0
WALKING_OR_CLIMBING_STAIRS_DIFFICULTY: NO
DOING_ERRANDS_INDEPENDENTLY_DIFFICULTY: NO
ADLS_ACUITY_SCORE: 35
ADLS_ACUITY_SCORE: 35
DIFFICULTY_EATING/SWALLOWING: OTHER (SEE COMMENTS)
ADLS_ACUITY_SCORE: 27
ADLS_ACUITY_SCORE: 35
ADLS_ACUITY_SCORE: 33
CHANGE_IN_FUNCTIONAL_STATUS_SINCE_ONSET_OF_CURRENT_ILLNESS/INJURY: NO
DRESSING/BATHING_DIFFICULTY: NO
TOILETING_ISSUES: NO
FALL_HISTORY_WITHIN_LAST_SIX_MONTHS: NO
ADLS_ACUITY_SCORE: 27

## 2023-10-10 NOTE — PROGRESS NOTES
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 Report was that he had gone to [employer] on the day of admission, family noticed he had been gone for awhile and they found him in his car confused and unable to speak. Brought to ED for evaluation  *In ED, was febrile with T 101.2, VS otherwise stable on O2 sats stable on RA. Labs were generally unremarkable, though CRP elevated at 11.9. UA bland and not suggestive of infection. CXR neg. Head CT and CTA head/neck were neg. Was found to be COVID+ on viral swab. Treatment not pursued dt stable respiratory status.   *Condition quickly improved -- mentating appropriately on 10/10, some mild situational confusion persisted (in setting of possible cog impairment as below) but no focal neurologic deficits  -- remains afebrile, blood cultures on admission remain neg  -- clinical picture now seems more suggestive of TIA in setting of having recently stopped his DOAC  -- cont serial neuro checks  -- ongoing workup ordered with MRI brain  -- consider neurology consult pending MRI findings     Possible underlying mild cognitive impairment  *Saw neurology earlier this year, manages with Aricept HS which has been continued.    COVID-19 infection  *With presentation/workup as above.  -- no respiratory sx  -- no indication for specific treatment with steroids or remdesivir in absence of hypoxia    Atrial fibrillation, on chronic anticoagulation with DOAC  Hx of recent RLE hematoma, diagnosed in ED visit on 10/4/23  *Was seen in ED on 10/4/23 for evaluation of RLE swelling, diagnosed  "with a hematoma and was told to stop his Xarelto for 1 wk.   *EKG on admission showed NSR with 1st degree AVB.   -- monitoring on telemetry  -- DOAC held on admission with ongoing evaluation above, placed on Lovenox for DVT ppx  -- hgb stable at 12    Hx narcolepsy  *Chronic and stable on modafinil (takes daily and prn at noon)    Glaucoma  *Chronic and stable on eye drops     FEN: no IVFs, lytes stable, regular diet  DVT Prophylaxis: Lovenox  Code Status: Full Code    Disposition: Anticipate discharge in 1-2d, pending continued improvement and completion of neurology workup as above.     Called daughter Steven this morning, left brief VM (she had been updated per bedside RN earlier today)    Taylor Navarrete, DO    Medical Decision Making       Please see A&P for additional details of medical decision making.       Interval History   Chart reviewed. Seen this morning. Mentation improved! A&Ox3, able to tell me he was going to [employer] yesterday for items he needs to fix stairs. Conversation somewhat tangential. Unable to recall how he got here. No specific complaints this AM, denies cp/sob/cough, abd pain/n/v. Ate breakfast. Able to tell me that his anticoagulation was stopped 1 wk ago because of \"bleeding in my right leg\", I was able to confirm this based on ED records in chart review.     -Data reviewed today: I reviewed all new labs and imaging results over the last 24 hours. I personally reviewed no images or EKG's today.    Physical Exam   Temp: 99.5  F (37.5  C) Temp src: Oral BP: 114/60 Pulse: 74   Resp: 18 SpO2: 95 % O2 Device: None (Room air)    Vitals:    10/09/23 1739 10/09/23 1821 10/10/23 0148   Weight: 84.2 kg (185 lb 10 oz) 84.2 kg (185 lb 10 oz) 83.3 kg (183 lb 10.3 oz)     Vital Signs with Ranges  Temp:  [98.4  F (36.9  C)-101.2  F (38.4  C)] 99.5  F (37.5  C)  Pulse:  [62-81] 74  Resp:  [11-28] 18  BP: (114-157)/(58-86) 114/60  SpO2:  [95 %-98 %] 95 %  I/O last 3 completed shifts:  In: - "   Out: 100 [Urine:100]    Constitutional: Resting comfortably, A&OX3, NAD  Respiratory: CTAB, no wheeze/rales/rhonchi, no increased work of breathing  Cardiovascular: HRRR, no MGR, no LE edema  GI: S, NT, ND, +BS  Skin/Integumen: warm/dry  Other: CNs 2-12 intact, no focal motor/sensory deficits    Medications    - MEDICATION INSTRUCTIONS -        enoxaparin ANTICOAGULANT  40 mg Subcutaneous Q24H       Data   Recent Labs   Lab 10/10/23  0840 10/09/23  1739   WBC 6.4 6.3   HGB 12.9* 12.6*   MCV 96 95    228   INR  --  1.20*   NA  --  137   POTASSIUM  --  4.2   CHLORIDE  --  103   CO2  --  23   BUN  --  24.6*   CR  --  0.99   ANIONGAP  --  11   RAJENDRA  --  8.7*   GLC  --  111*       Recent Results (from the past 24 hour(s))   CT Head w/o Contrast    Narrative    EXAM: CT HEAD W/O CONTRAST  LOCATION: [facility]  DATE: 10/9/2023    INDICATION: Code Stroke to evaluate for potential thrombolysis and thrombectomy. PLEASE READ IMMEDIATELY. Confusion.  COMPARISON: None.  TECHNIQUE: Routine CT Head without IV contrast. Multiplanar reformats. Dose reduction techniques were used.      Impression    IMPRESSION:  No evidence of hemorrhage. No convincing acute infarct. Volume loss and background of white matter hypoattenuation likely representing chronic small vessel ischemic change. No acute osseous abnormality. Nasal septal defect noted.    Findings were discussed by phone between Dr. Arreola and Dr. White at approximately 5:56 PM on 10/9/2023.   CTA Head Neck with Contrast    Narrative    EXAM: CTA HEAD NECK W CONTRAST  LOCATION: [facility]  DATE: 10/9/2023    INDICATION: Code Stroke to evaluate for potential thrombolysis and thrombectomy. PLEASE READ IMMEDIATELY. Confusion.  COMPARISON: CTA of the head and neck 06/28/2023.  CONTRAST: 67 mL Isovue-[id number].  TECHNIQUE: Axial helical CT images of the head and neck vessels obtained during the arterial phase of intravenous contrast  administration. Axial 2D reconstructed images and multiplanar 3D MIP reconstructed images of the head and neck vessels were   performed by the technologist. Dose reduction techniques were used. All stenosis measurements made according to NASCET criteria unless otherwise specified.    FINDINGS:   HEAD CTA:  No evidence of large vessel occlusion, high-grade stenosis, aneurysm, or vascular malformation. Incidental proximal basilar artery fenestration, unchanged. Tiny (2 mm or less) nonspecific outpouchings of the bilateral internal carotid arteries at the   supraclinoid segments, presumably incidental infundibula, unchanged.    NECK CTA:  No evidence of large vessel occlusion, high-grade stenosis, or dissection.    INCIDENTAL FINDINGS: Cervical spine degenerative changes with multiple stenoses. Presumed scarring/atelectasis at the lung apices. Apical calcified pleural plaquing. Nodule at the right lung apex measuring approximately 6 mm in diameter for which   optional outpatient chest CT workup could be pursued if the patient is at high risk for a pulmonary malignancy, unchanged.      Impression    IMPRESSION:   HEAD CTA:   1.  No evidence of large vessel occlusion or high-grade stenosis.    NECK CTA:  1.  No evidence of large vessel occlusion or high-grade stenosis.   Chest XR,  PA & LAT    Narrative    EXAM: XR CHEST 2 VIEWS  LOCATION: [facility]  DATE: 10/9/2023    INDICATION: ams, fever  COMPARISON: None.      Impression    IMPRESSION: Negative chest.

## 2023-10-10 NOTE — PHARMACY-ADMISSION MEDICATION HISTORY
Pharmacist Admission Medication History    Admission medication history is complete. The information provided in this note is only as accurate as the sources available at the time of the update.    Information Source(s): Clinic records, Hospital records, and [facility]A    Pertinent Information: Patient's Xarelto placed on hold 10/4 d/t leg hematoma, initial plan to hold for 1 week    Changes made to PTA medication list:  Added: None  Deleted: None  Changed: None    Medication Affordability:  Not including over the counter (OTC) medications, was there a time in the past 3 months when you did not take your medications as prescribed because of cost?: No    Allergies reviewed with patient and updates made in EHR: no    Medication History Completed By: TORSTEN WILKS RP 10/10/2023 7:42 AM    Prior to Admission medications    Medication Sig Last Dose Taking? Auth Provider Long Term End Date   donepezil (ARICEPT) 5 MG tablet Take 10 mg by mouth at bedtime Past Week Yes Unknown, Entered By History     latanoprost (XALATAN) 0.005 % ophthalmic solution Place 1 drop into both eyes at bedtime Past Week Yes Unknown, Entered By History Yes    modafinil (PROVIGIL) 200 MG tablet Take 200 mg by mouth daily Past Week Yes Unknown, Entered By History     modafinil (PROVIGIL) 200 MG tablet Take 200 mg by mouth daily as needed (at noon for narcolepsy) Past Week Yes Unknown, Entered By History     timolol maleate (TIMOPTIC) 0.5 % ophthalmic solution Place 1 drop into the right eye daily Past Week Yes Unknown, Entered By History     rivaroxaban ANTICOAGULANT (XARELTO) 20 MG TABS tablet Take 20 mg by mouth daily (with dinner) 10/3/2023  Unknown, Entered By History Yes

## 2023-10-10 NOTE — ED NOTES
"[facility]  ED Nurse Handoff Report    ED Chief complaint: Extremity Weakness      ED Diagnosis:   Final diagnoses:   COVID-19   Weakness   Altered mental status, unspecified altered mental status type       Code Status: to be discussed with inpatient MD    Allergies: No Known Allergies    Patient Story: Patient presents to ED via EMS after being in the [employer] parking lot for approx 3 hours Patient was confused upon EMS arrival.   Focused Assessment:  Patient alert and more oriented then first arrival. Weakness when standing. Denies any CP,cough, or other symptoms. COVID +, CT head negative.     Treatments and/or interventions provided: See MAR  Patient's response to treatments and/or interventions: Patient resting in bed with no acute needs at this time.    To be done/followed up on inpatient unit:  See inpatient orders.     Does this patient have any cognitive concerns?:  Patient oriented to person, time, and sometimes place. Has not been impulsive, uses call light as needed.     Activity level - Baseline/Home:  Independent  Activity Level - Current:   Stand with Assist    Patient's Preferred language: English   Needed?: No    Isolation: None and COVID r/o and special precautions  Infection: Not Applicable  COVID +  Patient tested for COVID 19 prior to admission: YES  Bariatric?: No    Vital Signs:   Vitals:    10/09/23 1821 10/09/23 1915 10/09/23 2034 10/09/23 2156   BP:  (!) 154/62 (!) 142/86 (!) 157/62   Pulse:  72 66 64   Resp:  16 18 19   Temp:       TempSrc:       SpO2:  96% 98%    Weight: 84.2 kg (185 lb 10 oz)      Height: 1.981 m (6' 6\")          Cardiac Rhythm:     Was the PSS-3 completed:   Yes  What interventions are required if any?               Family Comments: Daughter will be coming back tomorrow, took patient belongings with her. Number in chart.   OBS brochure/video discussed/provided to patient/family: N/A              Name of person given brochure if not " patient: NA              Relationship to patient: NA    For the majority of the shift this patient's behavior was Green.   Behavioral interventions performed were NA.    ED NURSE PHONE NUMBER: [phone]

## 2023-10-10 NOTE — PROGRESS NOTES
10/10/23 1200   Appointment Info   Signing Clinician's Name / Credentials (PT) Diana Faustin DPT   Rehab Comments (PT) COVID   Quick Adds   Quick Adds Certification   Living Environment   People in Home spouse   Current Living Arrangements house   Home Accessibility stairs to enter home   Number of Stairs, Main Entrance 2   Stair Railings, Main Entrance railings on both sides of stairs   Transportation Anticipated car, drives self   Living Environment Comments Lives in house with wife 2STE, stairs down to basement   Self-Care   Usual Activity Tolerance moderate   Current Activity Tolerance moderate   Regular Exercise No   Equipment Currently Used at Home cane, straight   Fall history within last six months no   Activity/Exercise/Self-Care Comment IND w/ all ADLs, drives himself and does not use an AD   General Information   Onset of Illness/Injury or Date of Surgery 10/09/23   Referring Physician Shai Gutierrez MD   Patient/Family Therapy Goals Statement (PT) Go home   Pertinent History of Current Problem (include personal factors and/or comorbidities that impact the POC) Donald L Severson is a 83 year old male with PMHx of afib on anticoagulation with DOAC, narcolepsy, possible mild cog impairment who was admitted on 10/9/2023 with suspected infectious encephalopathy dt COVID19 associated infection   Existing Precautions/Restrictions fall   Cognition   Affect/Mental Status (Cognition) WFL   Orientation Status (Cognition) oriented x 4   Follows Commands (Cognition) WFL   Pain Assessment   Patient Currently in Pain No   Integumentary/Edema   Integumentary/Edema no deficits were identifed   Posture    Posture Forward head position   Range of Motion (ROM)   Range of Motion ROM is WFL   Strength (Manual Muscle Testing)   Strength (Manual Muscle Testing) Deficits observed during functional mobility;strength is WFL   Bed Mobility   Comment, (Bed Mobility) NT d/t pt in chair upon arrival urgently needing  bathroom   Transfers   Comment, (Transfers) sit<>stand SBA FWW   Gait/Stairs (Locomotion)   Comment, (Gait/Stairs) Amb 15ft FWW SBA   Balance   Balance Comments Standing FWW SBA   Clinical Impression   Criteria for Skilled Therapeutic Intervention Yes, treatment indicated   PT Diagnosis (PT) Decreased activity tolerance   Influenced by the following impairments Confusion, decreased strength   Functional limitations due to impairments impaired mobility   Clinical Presentation (PT Evaluation Complexity) Stable/Uncomplicated   Clinical Presentation Rationale clin judgement   Clinical Decision Making (Complexity) low complexity   Planned Therapy Interventions (PT) balance training;bed mobility training;gait training;home exercise program;stair training;strengthening;stretching;transfer training   Anticipated Equipment Needs at Discharge (PT) walker, rolling   Risk & Benefits of therapy have been explained evaluation/treatment results reviewed;care plan/treatment goals reviewed;risks/benefits reviewed;current/potential barriers reviewed;participants voiced agreement with care plan;participants included;patient   PT Total Evaluation Time   PT Eval, Low Complexity Minutes (58460) 15   Therapy Certification   Start of care date 10/10/23   Certification date from 10/10/23   Certification date to 10/21/23   Physical Therapy Goals   PT Frequency 5x/week   PT Predicted Duration/Target Date for Goal Attainment 10/17/23   PT Goals Bed Mobility;Transfers;Gait;Stairs   PT: Bed Mobility Independent;Supine to/from sit;Rolling;Bridging   PT: Transfers Independent;Sit to/from stand;Bed to/from chair;Assistive device   PT: Gait Independent;Greater than 200 feet   PT: Stairs Independent;2 stairs;Rail on both sides   Interventions   Interventions Quick Adds Therapeutic Activity   Therapeutic Activity   Therapeutic Activities: dynamic activities to improve functional performance Minutes (99513) 45   Symptoms Noted During/After Treatment  None   Treatment Detail/Skilled Intervention Pt attempting to get out of chair upon PT arrival, alarm going off. Sit<>stand SBA, pt had large loose stool  in chair. Amb 15ft FWW SBA to toilet with patient continuing to have loose stool on the floor. Pt completed several sit<>stands SBA w/ FWW during pericares. Significant time in room spent completing pericares. Pt handed off to nursing assistant on toilet as pt continued to have loose stool and appeared to require additonal non therapeutic clean up.   PT Discharge Planning   PT Plan progress amb in room, DGI or other balance assessment   PT Discharge Recommendation (DC Rec) Transitional Care Facility;home with assist   PT Rationale for DC Rec Pt appears below functional baseline though eval was limited by pericares. Pt requires SBA w/ FWW, PLOF IND w/o AD. Until further is know about supervision level at home, recommend TCU. If SBA is available, pending stairs then pt can return home with HCPT.   PT Brief overview of current status SBA FWW, total A for ravi cares   Total Session Time   Timed Code Treatment Minutes 45   Total Session Time (sum of timed and untimed services) 60     [facility]  OUTPATIENT PHYSICAL THERAPY EVALUATION  PLAN OF TREATMENT FOR OUTPATIENT REHABILITATION  (COMPLETE FOR INITIAL CLAIMS ONLY)  Patient's Last Name, First Name, M.I.  YOB: 1940  Severson,Donald L                        Provider's Name  [facility] Medical Record No.  [id number]                             Onset Date:  10/09/23   Start of Care Date:  10/10/23   Type:     _X_PT   ___OT   ___SLP Medical Diagnosis:                 PT Diagnosis:  Decreased activity tolerance Visits from SOC:  1     See note for plan of treatment, functional goals and certification details    I CERTIFY THE NEED FOR THESE SERVICES FURNISHED UNDER        THIS PLAN OF TREATMENT AND WHILE UNDER MY CARE     (Physician co-signature  of this document indicates review and certification of the therapy plan).

## 2023-10-10 NOTE — PLAN OF CARE
Summary: Donald L Severson is a 83 year old male admitted on 10/9/2023. He presents with altered mental status  DATE & TIME: 10/10/23 1575-5929    Cognitive Concerns/ Orientation : A&O x4, forgetful at times,   Neuro (q4hr): intact  BEHAVIOR & AGGRESSION TOOL COLOR: Green   ABNL VS/O2: VSS on RA. No SOB reported. Infrequent, dry cough  Tele: A-fib CVR  MOBILITY: Ax1 GB/Walker  PAIN MANAGMENT: Denies  DIET: Regular-good appetite   BOWEL/BLADDER: Cont bladder; uses urinal. Incont bowel at times, urgency and diarrhea.   ABNL LAB/BG: Covid (+), UC-pending  DRAIN/DEVICES: PIV SL  TELEMETRY RHYTHM: SR  SKIN: Scattered bruises, scab  TESTS/PROCEDURES: MRI needed- not completed yet  D/C DAY/GOALS/PLACE: Pending improvement 1-2 days home w/ assist vs TCU   OTHER IMPORTANT INFO: PT following.  - MRI to rule out TIA  - no indication for remdesivir, dexamethasone at this time.

## 2023-10-10 NOTE — PROGRESS NOTES
RECEIVING UNIT ED HANDOFF REVIEW    ED Nurse Handoff Report was reviewed by: Hien Grace RN on October 10, 2023 at 12:51 AM       Offered and provided

## 2023-10-10 NOTE — PLAN OF CARE
Goal Outcome Evaluation:       Summary: Donald L Severson is a 83 year old male admitted on 10/9/2023. He presents with altered mental status  DATE & TIME: 10/10/23 5023-2330    Cognitive Concerns/ Orientation : A&O x2-3, [place]  BEHAVIOR & AGGRESSION TOOL COLOR: Green   ABNL VS/O2: VSS RA  MOBILITY: Ax1 GB/W  PAIN MANAGMENT: Denies  DIET: Regular  BOWEL/BLADDER: Continent, urinal in bed   ABNL LAB/BG: Covid Positive, INR 1.20  DRAIN/DEVICES: PIV SL  TELEMETRY RHYTHM: SR  SKIN: Scattered bruises, scab  TESTS/PROCEDURES: MRI  D/C DAY/GOALS/PLACE: Pending improvement  OTHER IMPORTANT INFO: pt is alert but confused, unable to do the MRI checklist, family will be here in the morning,blood &  urin cultures pending  - MRI to complete encephalopathy/ CVA workup  - no indication for remdesivir, dexamethasone at this time. Pt is on telemetry and q4 neuro checks

## 2023-10-10 NOTE — H&P
[facility]    History and Physical - Hospitalist Service       Date of Admission:  10/9/2023    Assessment & Plan      Donald L Severson is a 83 year old male admitted on 10/9/2023. He presents with altered mental status    Note: pt confused and unable to obtain history. Attempt[facility]    COVID-19 pneumonia  Encephalopathy, suspect infectious  Pt went to [employer] today, family noted he was gone awhile and found him in his car confused and unable to speak. In ED temp 101.2, O2 sats normal. Labs generally normal. CXR w/o acute process. CT head w/o acute process, CTA w/o significant narrowing.   - telemetry   - q4 neuro checks  - supplemental O2 prn  - blood cultures pending  - urine cx pending  - MRI to complete encephalopathy/ CVA workup  - no indication for remdesivir, dexamethasone  - CRP, d-dimer pending  - lovenox ppx  - consider neuro consult pending MRI, progress    Atrial fibrillation  - hold xarelto or restart with rec and hold lovenox    Hx narcolepsy  - resume provigil with rec    Possible mild cognitive impairment  Saw neurology earlier this year for this.        Diet: NPO for Medical/Clinical Reasons Except for: Meds    DVT Prophylaxis: DOAC  Martinez Catheter: Not present  Lines: None     Cardiac Monitoring: None  Code Status:  Full by default, clarify when mental status more clear    Clinically Significant Risk Factors Present on Admission               # Coagulation Defect: INR = 1.20 (Ref range: 0.85 - 1.15) and/or PTT = 28 Seconds (Ref range: 22 - 38 Seconds), will monitor for bleeding                    Disposition Plan           Shai Gutierrez MD  Hospitalist Service  [facility]  Securely message with [employer] (more info)  Text page via [employer] Paging/Directory     ______________________________________________________________________    Chief Complaint   Altered mental status    History is obtained from the  patient, electronic health record, and emergency department physician    History of Present Illness   Donald L Severson is a 83 year old male who presents with confusion.  History is very limited as the patient remains confused and unable to call family at this time.  Patient reportedly went to [employer] earlier this afternoon and his family noticed he had been gone a long time.  He eventually found him in the parking lot inside his car confused and unable to speak.  There was also some concern about his leg being swollen as well as a reported hematoma causing him to hold his Xarelto.  Patient currently denies complaints.  He denies any chest pain or shortness of breath.  Denies any abdominal pain.  He is not able to recount the events of today.      Past Medical History    Afib  narcolepsy    Past Surgical History   No past surgical history on file.    Prior to Admission Medications   Prior to Admission Medications   Prescriptions Last Dose Informant Patient Reported? Taking?   meclizine (ANTIVERT) 25 MG tablet   No No   Sig: Take 1 tablet (25 mg) by mouth 3 times daily as needed for dizziness   ondansetron (ZOFRAN ODT) 4 MG ODT tab   No No   Sig: Take 1 tablet (4 mg) by mouth every 6 hours as needed for nausea      Facility-Administered Medications: None        Review of Systems    Review of systems not obtained due to patient factors - confusion     Physical Exam   Vital Signs: Temp: (!) 101.2  F (38.4  C) Temp src: Temporal BP: (!) 142/86 Pulse: 66   Resp: 18 SpO2: 98 % O2 Device: None (Room air)    Weight: 185 lbs 10.04 oz    General Appearance: Alert, pleasant, confused, not oriented  Respiratory: clear bilaterally  Cardiovascular: RRR, no murmur. No edema  GI: soft, nt/nd  Skin: no rashes or lesions grossly  .  Other: CN grossly intact, JUSTICE      Medical Decision Making       60 MINUTES SPENT BY ME on the date of service doing chart review, history, exam, documentation & further activities per the note.       Data     I have personally reviewed the following data over the past 24 hrs:    6.3  \   12.6 (L)   / 228     137 103 24.6 (H) /  111 (H)   4.2 23 0.99 \     Trop: 13 BNP: N/A     Procal: N/A CRP: 11.94 (H) Lactic Acid: 1.1       INR:  1.20 (H) PTT:  28   D-dimer:  1.04 (H) Fibrinogen:  N/A       Imaging results reviewed over the past 24 hrs:   Recent Results (from the past 24 hour(s))   CT Head w/o Contrast    Narrative    EXAM: CT HEAD W/O CONTRAST  LOCATION: [facility]  DATE: 10/9/2023    INDICATION: Code Stroke to evaluate for potential thrombolysis and thrombectomy. PLEASE READ IMMEDIATELY. Confusion.  COMPARISON: None.  TECHNIQUE: Routine CT Head without IV contrast. Multiplanar reformats. Dose reduction techniques were used.      Impression    IMPRESSION:  No evidence of hemorrhage. No convincing acute infarct. Volume loss and background of white matter hypoattenuation likely representing chronic small vessel ischemic change. No acute osseous abnormality. Nasal septal defect noted.    Findings were discussed by phone between Dr. Arreola and Dr. White at approximately 5:56 PM on 10/9/2023.   CTA Head Neck with Contrast    Narrative    EXAM: CTA HEAD NECK W CONTRAST  LOCATION: [facility]  DATE: 10/9/2023    INDICATION: Code Stroke to evaluate for potential thrombolysis and thrombectomy. PLEASE READ IMMEDIATELY. Confusion.  COMPARISON: CTA of the head and neck 06/28/2023.  CONTRAST: 67 mL [id number].  TECHNIQUE: Axial helical CT images of the head and neck vessels obtained during the arterial phase of intravenous contrast administration. Axial 2D reconstructed images and multiplanar 3D MIP reconstructed images of the head and neck vessels were   performed by the technologist. Dose reduction techniques were used. All stenosis measurements made according to NASCET criteria unless otherwise specified.    FINDINGS:   HEAD CTA:  No evidence of large vessel  occlusion, high-grade stenosis, aneurysm, or vascular malformation. Incidental proximal basilar artery fenestration, unchanged. Tiny (2 mm or less) nonspecific outpouchings of the bilateral internal carotid arteries at the   supraclinoid segments, presumably incidental infundibula, unchanged.    NECK CTA:  No evidence of large vessel occlusion, high-grade stenosis, or dissection.    INCIDENTAL FINDINGS: Cervical spine degenerative changes with multiple stenoses. Presumed scarring/atelectasis at the lung apices. Apical calcified pleural plaquing. Nodule at the right lung apex measuring approximately 6 mm in diameter for which   optional outpatient chest CT workup could be pursued if the patient is at high risk for a pulmonary malignancy, unchanged.      Impression    IMPRESSION:   HEAD CTA:   1.  No evidence of large vessel occlusion or high-grade stenosis.    NECK CTA:  1.  No evidence of large vessel occlusion or high-grade stenosis.   Chest XR,  PA & LAT    Narrative    EXAM: XR CHEST 2 VIEWS  LOCATION: [facility]  DATE: 10/9/2023    INDICATION: ams, fever  COMPARISON: None.      Impression    IMPRESSION: Negative chest.

## 2023-10-10 NOTE — PLAN OF CARE
Goal Outcome Evaluation:       Summary: Donald L Severson is a 83 year old male admitted on 10/9/2023. He presents with altered mental status  DATE & TIME: 10/10/23 [id number]    Cognitive Concerns/ Orientation : A&O x4, [place], forgetful, neuro's intact   BEHAVIOR & AGGRESSION TOOL COLOR: Green   ABNL VS/O2: VSS RA  MOBILITY: Ax1 GB/Walker, ambulated in halls, showered   PAIN MANAGMENT: Denies  DIET: Regular-good appetite   BOWEL/BLADDER: Incontinent stool x1, urinal in bedside   ABNL LAB/BG: Covid Positive, INR 1.20, Hemoglobin 12.9  DRAIN/DEVICES: PIV SL  TELEMETRY RHYTHM: SR  SKIN: Scattered bruises, scab  TESTS/PROCEDURES: MRI needs to be done still today- checklist sent to MRI   D/C DAY/GOALS/PLACE: Pending improvement 1-2 days   OTHER IMPORTANT INFO: Urine culture pending.  - MRI to rule out TIA  - no indication for remdesivir, dexamethasone at this time. Pt is on telemetry and q4 neuro checks

## 2023-10-11 ENCOUNTER — APPOINTMENT (OUTPATIENT)
Dept: PHYSICAL THERAPY | Facility: CLINIC | Age: 83
End: 2023-10-11
Payer: COMMERCIAL

## 2023-10-11 LAB
BACTERIA UR CULT: NORMAL
ERYTHROCYTE [DISTWIDTH] IN BLOOD BY AUTOMATED COUNT: 12.3 % (ref 10–15)
HCT VFR BLD AUTO: 38.8 % (ref 40–53)
HGB BLD-MCNC: 12.7 G/DL (ref 13.3–17.7)
MCH RBC QN AUTO: 31.1 PG (ref 26.5–33)
MCHC RBC AUTO-ENTMCNC: 32.7 G/DL (ref 31.5–36.5)
MCV RBC AUTO: 95 FL (ref 78–100)
PLATELET # BLD AUTO: 201 10E3/UL (ref 150–450)
RBC # BLD AUTO: 4.08 10E6/UL (ref 4.4–5.9)
WBC # BLD AUTO: 4.8 10E3/UL (ref 4–11)

## 2023-10-11 PROCEDURE — 96372 THER/PROPH/DIAG INJ SC/IM: CPT | Performed by: INTERNAL MEDICINE

## 2023-10-11 PROCEDURE — 250N000011 HC RX IP 250 OP 636: Mod: JZ | Performed by: INTERNAL MEDICINE

## 2023-10-11 PROCEDURE — 99232 SBSQ HOSP IP/OBS MODERATE 35: CPT | Performed by: INTERNAL MEDICINE

## 2023-10-11 PROCEDURE — 85027 COMPLETE CBC AUTOMATED: CPT | Performed by: INTERNAL MEDICINE

## 2023-10-11 PROCEDURE — 97530 THERAPEUTIC ACTIVITIES: CPT | Mod: GP

## 2023-10-11 PROCEDURE — 250N000013 HC RX MED GY IP 250 OP 250 PS 637: Performed by: INTERNAL MEDICINE

## 2023-10-11 PROCEDURE — 97116 GAIT TRAINING THERAPY: CPT | Mod: GP

## 2023-10-11 PROCEDURE — 97750 PHYSICAL PERFORMANCE TEST: CPT | Mod: GP

## 2023-10-11 PROCEDURE — G0378 HOSPITAL OBSERVATION PER HR: HCPCS

## 2023-10-11 PROCEDURE — 36415 COLL VENOUS BLD VENIPUNCTURE: CPT | Performed by: INTERNAL MEDICINE

## 2023-10-11 RX ORDER — LOPERAMIDE HCL 2 MG
2 CAPSULE ORAL 4 TIMES DAILY PRN
Status: DISCONTINUED | OUTPATIENT
Start: 2023-10-11 | End: 2023-10-12 | Stop reason: HOSPADM

## 2023-10-11 RX ORDER — LOPERAMIDE HCL 2 MG
2 CAPSULE ORAL 4 TIMES DAILY PRN
DISCHARGE
Start: 2023-10-11

## 2023-10-11 RX ADMIN — LATANOPROST 1 DROP: 50 SOLUTION/ DROPS OPHTHALMIC at 21:38

## 2023-10-11 RX ADMIN — RIVAROXABAN 20 MG: 20 TABLET, FILM COATED ORAL at 17:27

## 2023-10-11 RX ADMIN — TIMOLOL MALEATE 1 DROP: 5 SOLUTION/ DROPS OPHTHALMIC at 09:33

## 2023-10-11 RX ADMIN — MODAFINIL 200 MG: 200 TABLET ORAL at 09:31

## 2023-10-11 RX ADMIN — DONEPEZIL HYDROCHLORIDE 10 MG: 5 TABLET, FILM COATED ORAL at 21:38

## 2023-10-11 RX ADMIN — ENOXAPARIN SODIUM 40 MG: 40 INJECTION SUBCUTANEOUS at 09:34

## 2023-10-11 ASSESSMENT — ACTIVITIES OF DAILY LIVING (ADL)
DEPENDENT_IADLS:: CLEANING;COOKING;LAUNDRY;MEAL PREPARATION
ADLS_ACUITY_SCORE: 31
ADLS_ACUITY_SCORE: 35
ADLS_ACUITY_SCORE: 35
ADLS_ACUITY_SCORE: 33
ADLS_ACUITY_SCORE: 35
ADLS_ACUITY_SCORE: 33
ADLS_ACUITY_SCORE: 35
ADLS_ACUITY_SCORE: 31
ADLS_ACUITY_SCORE: 35
ADLS_ACUITY_SCORE: 31

## 2023-10-11 NOTE — PLAN OF CARE
Goal Outcome Evaluation: Transient encephalopathy, now resolved, with concern for TIA Possible underlying mild cognitive impairment        Orientation: COVID-19 infection A/O x4 alert cooperative, walked with to the bathroom with me and his daughter, will be here soon so they can discuss with his discharge or TCU  Denies pain      Vitals: VSS on R A  pt on Tele with R NS  Neuro Intact CMS checks are discontinues     IV Access/drains: PIV SL    Diet:  Regular with a great appetite     Mobility: Ax1 with G,B/W    GI/: Continent/Incontinent     Wound/Skin: Scattered bruises, scabs     Consults: PT. For general weakness     Discharge Plan: TBD      See Flow sheets for assessment

## 2023-10-11 NOTE — PLAN OF CARE
Goal Outcome Evaluation:       Summary: Donald L Severson is a 83 year old male admitted on 10/9/2023. He presents with altered mental status  DATE & TIME: 10/10/23 [id number]    Cognitive Concerns/ Orientation : A&O x4, forgetful at times,   Neuro (q4hr): intact  BEHAVIOR & AGGRESSION TOOL COLOR: Green   ABNL VS/O2: VSS on RA. No SOB reported. Infrequent, dry cough  Tele: A-fib CVR  MOBILITY: Ax1 GB/Walker  PAIN MANAGMENT: Denies  DIET: Regular-good appetite   BOWEL/BLADDER: Cont bladder; uses urinal. Incont bowel at times, urgency and diarrhea.   ABNL LAB/BG: Covid (+), UC-pending  DRAIN/DEVICES: PIV SL  TELEMETRY RHYTHM: SR  SKIN: Scattered bruises, scab  TESTS/PROCEDURES: MRI  completed yesterday  D/C DAY/GOALS/PLACE: Pending improvement 1-2 days home w/ assist vs TCU   OTHER IMPORTANT INFO: PT following.  - MRI to rule out TIA  - no indication for remdesivir, dexamethasone at this time.

## 2023-10-11 NOTE — PROGRESS NOTES
[facility]    Hospitalist Progress Note    Assessment & Plan   Donald L Severson is a 83 year old male with PMHx of afib on anticoagulation with DOAC, narcolepsy, possible mild cog impairment who was admitted on 10/9/2023 with suspected infectious encephalopathy dt COVID19 associated infection      Transient encephalopathy, now resolved, with concern for TIA vs dt heat exposure  *Limited HPI available on admission as patient was confused. Report was that he had gone to [employer] on the day of admission, family noticed he had been gone for awhile and they found him in his car confused and unable to speak. The heat was on and the car felt warm. Brought to ED for evaluation  *In ED, was febrile with T 101.2, VS otherwise stable on O2 sats stable on RA. Labs were generally unremarkable, though CRP elevated at 11.9. UA bland and not suggestive of infection (UC later grew urogenital cathy). CXR neg. Code stroke was called. Head CT and CTA head/neck were neg. Was found to be COVID+ on viral swab. Treatment not pursued dt stable respiratory status.   *Condition quickly improved -- mentating appropriately on 10/10, some mild situational confusion persisted (in setting of possible cog impairment as below) but no focal neurologic deficits.   *MRI brain neg for infarct.   *Clinical picture suggestive of TIA in setting of recently holding his anticoagulation for RLE hematoma.   -- remains afebrile, blood cultures on admission remain neg  -- resume DOAC     Possible underlying mild cognitive impairment  *Saw neurology earlier this year, manages with Aricept HS which has been continued.    COVID-19 infection  *With presentation/workup as above.  -- no respiratory sx  -- no indication for specific treatment with steroids or remdesivir in absence of hypoxia    Atrial fibrillation, on chronic anticoagulation with DOAC  Hx of recent RLE hematoma, diagnosed in ED visit on 10/4/23  *Was seen in ED on 10/4/23  for evaluation of RLE swelling, diagnosed with a hematoma and was told to stop his Xarelto for 1 wk.   *EKG on admission showed NSR with 1st degree AVB.   -- monitoring on telemetry  -- DOAC resumed on 10/11 as above  -- hgb stable at 12    Hx narcolepsy  *Chronic and stable on modafinil (takes daily and prn at noon)    Glaucoma  *Chronic and stable on eye drops     FEN: no IVFs, lytes stable, regular diet  DVT Prophylaxis: DOAC  Code Status: Full Code    Disposition: Medical issues stable. PT recs yesterday were for discharge to TCU vs home with assist. SW/CC following. Spoke with patient's daughter Steven this morning. She will be present at PT session today to help discern if she can help meet needs at home or if patient will need TCU stay. Discharge later today (if okay for home w/assist and/or home PT) vs tomorrow (if TCU stay needed).    1635 Addendum:  Discussed with PT after session this afternoon. Still recommend TCU stay. Daughter Steven at bedside and in agreement with TCU. CC/SW updated. Will get referrals out in AM    Taylor Navarrete, DO    Medical Decision Making       Please see A&P for additional details of medical decision making.       Interval History   Chart reviewed. Seen this morning. Remains alert and mentating appropriately. Conversation still tangential at times. Denies respiratory complaints, no sob/cough, abd pain/n/v. Inquiring about going home today. Expressed understanding in regards to why anticoagulation should be resumed.    -Data reviewed today: I reviewed all new labs and imaging results over the last 24 hours. I personally reviewed no images or EKG's today.    Physical Exam   Temp: 97.9  F (36.6  C) Temp src: Oral BP: 132/63 Pulse: 55   Resp: 16 SpO2: 96 % O2 Device: None (Room air)    Vitals:    10/09/23 1739 10/09/23 1821 10/10/23 0148   Weight: 84.2 kg (185 lb 10 oz) 84.2 kg (185 lb 10 oz) 83.3 kg (183 lb 10.3 oz)     Vital Signs with Ranges  Temp:  [97.7  F (36.5  C)-98.9   F (37.2  C)] 97.9  F (36.6  C)  Pulse:  [55-63] 55  Resp:  [16-18] 16  BP: (103-134)/(51-70) 132/63  SpO2:  [95 %-97 %] 96 %  I/O last 3 completed shifts:  In: -   Out: 550 [Urine:550]    Constitutional: Resting comfortably, A&Ox3, NAD  Respiratory: CTAB, no wheeze/rales/rhonchi, no increased work of breathing  Cardiovascular: HR irreg, no MGR, no LE edema  GI: S, NT, ND, +BS  Skin/Integumen: warm/dry  Other: CNs 2-12 intact, no focal motor/sensory deficits    Medications    - MEDICATION INSTRUCTIONS -        donepezil  10 mg Oral At Bedtime    enoxaparin ANTICOAGULANT  40 mg Subcutaneous Q24H    latanoprost  1 drop Both Eyes At Bedtime    modafinil  200 mg Oral Daily    timolol maleate  1 drop Right Eye Daily       Data   Recent Labs   Lab 10/11/23  0920 10/10/23  0840 10/09/23  1739   WBC 4.8 6.4 6.3   HGB 12.7* 12.9* 12.6*   MCV 95 96 95    222 228   INR  --   --  1.20*   NA  --   --  137   POTASSIUM  --   --  4.2   CHLORIDE  --   --  103   CO2  --   --  23   BUN  --   --  24.6*   CR  --   --  0.99   ANIONGAP  --   --  11   RAJENDRA  --   --  8.7*   GLC  --   --  111*       Recent Results (from the past 24 hour(s))   MR Brain w/o & w Contrast    Narrative    EXAM: MR BRAIN W/O and W CONTRAST  LOCATION: [facility]  DATE: 10/10/2023    INDICATION: encephalopathy, concern for CVA  COMPARISON: 10/09/2023, 06/29/2023  CONTRAST: 9 mL Gadavist  TECHNIQUE: Routine multiplanar multisequence head MRI without and with intravenous contrast.    FINDINGS:  INTRACRANIAL CONTENTS: No acute or subacute infarct. No acute hemorrhage. Retrocerebellar arachnoid cyst. No adverse intracranial mass effect. Patchy nonspecific T2/FLAIR hyperintensities within the cerebral white matter most consistent with mild to   moderate chronic microvascular ischemic change. Stable central predominant volume loss and ventriculomegaly. Normal position of the cerebellar tonsils. No pathologic contrast enhancement.    SELLA:  No abnormality accounting for technique.    OSSEOUS STRUCTURES/SOFT TISSUES: Normal marrow signal. The major intracranial vascular flow voids are maintained.     ORBITS: No abnormality accounting for technique.     SINUSES/MASTOIDS: Mild mucosal thickening scattered about the paranasal sinuses. No middle ear or mastoid effusion.       Impression    IMPRESSION:  1.  No acute intracranial process.  2.  Generalized brain atrophy and presumed microvascular ischemic changes as detailed above.

## 2023-10-11 NOTE — PROGRESS NOTES
Butt Balance Scale (BBS) Cutoff Scores: A score of < 45 /56 indicates an increased risk for falls.     The BBS is a measure of static and dynamic standing balance that has been validated in community dwelling elderly individuals and individuals who have Parkinson's Disease, MS, and those who are s/p CVA and TBI. The test is administered without an assistive device. Scores from the Butt are used to determine the probability of falling based on the patient's previous history of falls and their test performance.     Minimal Detectable Change = 6.5   & Minimal Detectable Change (Parkinson's Disease) = 5 according to Niko & Shirazey 2008    Assessment (rationale for performing, application to patient s function & care plan): Pt scored 32/56 and is at significant risk for falls, requires hands on assist at times to prevent falls. Pt would benefit from TCU to improve mobility and reduce risk of readmission.  (Minutes billed as physical performance test)      10/11/23 1500   Butt Balance Scale (BUTT K, ARSENIO S, FABIO CRESPO, TARIQ, B: MEASURING BALANCE IN THE ELDERLY: VALIDATION OF AN INSTRUMENT. CAN. J. PUB. HEALTH, JULY/AUGUST SUPPLEMENT 2:S7-11, 1992.)   Sit To Stand 3   Standing Unsupported 3   Sitting Unsupported 4   Stand to Sit 3   Transfers 2   Standing with Eyes Closed 3   Standing Unsupported, Feet Together 0   Reach Forward With Outstretched Arm 3   Retrieve Object From Floor 3   Turning to Look Behind 4   Turn 360 Degrees 2   Placing Alternate Foot on Stool (4-6 inches) 1   Unsupported Tandem Stand (Demonstrate to Subject) 0   One Leg Stand 1   Total Score (A score of 45 or less has been correlated with an increased risk of falls)   Total Score (out of 56) 32

## 2023-10-11 NOTE — CONSULTS
Care Management Initial Consult    General Information  Assessment completed with: Children, Daughter Steven  Type of CM/SW Visit: Offer D/C Planning    Primary Care Provider verified and updated as needed: Yes   Readmission within the last 30 days:           Advance Care Planning:            Communication Assessment  Patient's communication style: spoken language (English or Bilingual)    Hearing Difficulty or Deaf: yes   Wear Glasses or Blind: yes    Cognitive  Cognitive/Neuro/Behavioral: WDL  Level of Consciousness: alert  Arousal Level: opens eyes spontaneously  Orientation: oriented x 4  Mood/Behavior: calm, cooperative  Best Language: 0 - No aphasia  Speech: clear, spontaneous    Living Environment:   People in home: spouse     Current living Arrangements: house      Able to return to prior arrangements: other (see comments)  Living Arrangement Comments: patient's daughter wants to see how patient does with PT today before making a final decision on going home vs TCU    Family/Social Support:  Care provided by: self  Provides care for: no one  Marital Status:   Wife, Children          Description of Support System:           Current Resources:   Patient receiving home care services: No     Community Resources: None  Equipment currently used at home: cane, straight (Occassionally uses his wife's cane since his RLE hematoma)  Supplies currently used at home: None    Employment/Financial:  Employment Status: retired        Financial Concerns: none           Does the patient's insurance plan have a 3 day qualifying hospital stay waiver?  No    Lifestyle & Psychosocial Needs:  Social Determinants of Health     Food Insecurity: Not on file   Depression: Not on file   Housing Stability: Not on file   Tobacco Use: Not on file   Financial Resource Strain: Not on file   Alcohol Use: Not on file   Transportation Needs: Not on file   Physical Activity: Not on file   Interpersonal Safety: Not on file   Stress: Not on  file   Social Connections: Not on file       Functional Status:  Prior to admission patient needed assistance:   Dependent ADLs:: Independent, Ambulation-cane  Dependent IADLs:: Cleaning, Cooking, Laundry, Meal Preparation       Mental Health Status:  Mental Health Status: No Current Concerns       Chemical Dependency Status:  Chemical Dependency Status: No Current Concerns             Values/Beliefs:  Spiritual, Cultural Beliefs, Worship Practices, Values that affect care:  No               Additional Information:  Patient has Covid.  CM team does not see patient's with Covid face to face.    Care Coordinator called patient's Daughter Steven to explain Observation status and Care Management consult.  Dr Navarrete had talked to Steven this AM.  Observation status was explained and also explained role of care management in discharge planning.  Steven was open to discussion.  She noted that both of her patients seemed to be declining both mentally and physically.  Patient and his spouse live in a rambler with basement laundry.  They both drive.  Patient had a driving assessment in March and passed this, but was told not to be driving during rush hour.  Patient does his own lawn care and snow removal.  Steven noted he keeps busy with different projects around the house.  Patient's spouse is weaker that patient and is not really able to provide any physical help to patient per Steven's report.  Discussed going home with home care vs TCU.  TCU is currently being recommended.  Patient is Observation status and has a Medicare Advantage insurance plan, so he would probably have TCU coverage, but would need authorization from [employer].  Steven would like to see with her own eyes how well he is moving to help make the decision on disposition.  She will be in this afternoon.  The Charge RN is communicating with PT and then will call patient's daughter.  Steven works from home, so could do her work from there home, if he needed more  support.  Meanwhile, a home care referral has been placed and patient has been accepted by [facility].  Steven will schedule PCP follow-up within a week with Dr Maria.  If there is any labs due in the near future, it might be of benefit to ask the home care nurse to draw his labs.    Care Management will continue to follow for safe discharge planning.  SW is aware that patient may have a TCU need.     Addendum 1620: Patient is needing to transition to a TCU.  Conversed further with patient's Daughter Steven.  Patient is in agreement for a TCU.  Due to Covid, we are limited to facilities that have taken Covid patients recently, if they have a private room,  Referrals have been sent to [facility] and [facility].  Unsure if we also need OT assessment for [employer] to authorize.    Tali Barajas, RN  Inpatient Care Management  [phone]

## 2023-10-12 VITALS
DIASTOLIC BLOOD PRESSURE: 77 MMHG | RESPIRATION RATE: 16 BRPM | BODY MASS INDEX: 21.25 KG/M2 | SYSTOLIC BLOOD PRESSURE: 154 MMHG | HEART RATE: 59 BPM | TEMPERATURE: 97.7 F | OXYGEN SATURATION: 96 % | WEIGHT: 183.64 LBS | HEIGHT: 78 IN

## 2023-10-12 LAB
ERYTHROCYTE [DISTWIDTH] IN BLOOD BY AUTOMATED COUNT: 12 % (ref 10–15)
HCT VFR BLD AUTO: 42.2 % (ref 40–53)
HGB BLD-MCNC: 13.6 G/DL (ref 13.3–17.7)
MCH RBC QN AUTO: 30.8 PG (ref 26.5–33)
MCHC RBC AUTO-ENTMCNC: 32.2 G/DL (ref 31.5–36.5)
MCV RBC AUTO: 96 FL (ref 78–100)
PLATELET # BLD AUTO: 209 10E3/UL (ref 150–450)
RBC # BLD AUTO: 4.42 10E6/UL (ref 4.4–5.9)
WBC # BLD AUTO: 4.1 10E3/UL (ref 4–11)

## 2023-10-12 PROCEDURE — G0378 HOSPITAL OBSERVATION PER HR: HCPCS

## 2023-10-12 PROCEDURE — 99239 HOSP IP/OBS DSCHRG MGMT >30: CPT | Performed by: INTERNAL MEDICINE

## 2023-10-12 PROCEDURE — 250N000013 HC RX MED GY IP 250 OP 250 PS 637: Performed by: INTERNAL MEDICINE

## 2023-10-12 PROCEDURE — 85027 COMPLETE CBC AUTOMATED: CPT | Performed by: INTERNAL MEDICINE

## 2023-10-12 PROCEDURE — 36415 COLL VENOUS BLD VENIPUNCTURE: CPT | Performed by: INTERNAL MEDICINE

## 2023-10-12 RX ADMIN — MODAFINIL 200 MG: 200 TABLET ORAL at 09:14

## 2023-10-12 RX ADMIN — TIMOLOL MALEATE 1 DROP: 5 SOLUTION/ DROPS OPHTHALMIC at 09:14

## 2023-10-12 ASSESSMENT — ACTIVITIES OF DAILY LIVING (ADL)
ADLS_ACUITY_SCORE: 33

## 2023-10-12 NOTE — PROGRESS NOTES
Discharge    Patient discharged to [facility] via car with daughter  Care plan note  Pt Aox4; forgetful. VSS on RA. Denies CP/SOB. Denies pain. Tele: Sinus mallory (50s). Up assist x1 GB/W. Hx. A.fib on Xarelto. AVS reviewed with pt. PIVs removed. Pt belongings packed up and sent with patient. Discharged to [facility] with daughter.     Listed belongings gathered and given to patient (including from security/pharmacy). Yes  Care Plan and Patient education resolved: Yes  Prescriptions if needed, hard copies sent with patient  NA  Medication Bin checked and emptied on discharge Yes  SW/care coordinator/charge RN aware of discharge: Yes

## 2023-10-12 NOTE — PROGRESS NOTES
Care Management Follow Up    Length of Stay (days): 1    Expected Discharge Date: 10/12/2023     Concerns to be Addressed: discharge planning  Home care vs TCU  Patient plan of care discussed at interdisciplinary rounds: Yes    Anticipated Discharge Disposition: Home     Anticipated Discharge Services: Other (see comment) (Home care RN/PT/OT)  Anticipated Discharge DME: Walker    Patient/family educated on Medicare website which has current facility and service quality ratings:    Education Provided on the Discharge Plan: Yes  Patient/Family in Agreement with the Plan: other (see comments)    Referrals Placed by CM/BETSEY: Homecare  Private pay costs discussed: Not applicable    Additional Information:  BETSEY informed [facility] can accept patient this afternoon. BETSYE informed patient medically read and scheduled MH stretcher transport due to patient being Covid+ for today at [phone]. BETSEY called daughter Steven and left a vm informing of acceptance and inquiring what transport option she would like to move forward with. BETSEY will continue to follow.    CESILIA Garcia    [facility]

## 2023-10-12 NOTE — PLAN OF CARE
Goal Outcome Evaluation:      Plan of Care Reviewed With: patient    Overall Patient Progress: improvingOverall Patient Progress: improving         Summary: Donald L Severson is a 83 year old male admitted on 10/9/2023. He presents with altered mental status  DATE & TIME: 10/11/23, pm shift    Cognitive Concerns/ Orientation : A&O x4. Forgetful. [place], no hearing aids.  BEHAVIOR & AGGRESSION TOOL COLOR: Green   ABNL VS/O2: VSS except Hr mallory, in 50s. On RA. No SOB reported. Infrequent, dry cough  MOBILITY: Ax1 GB/Walker. PT assessed patient and patient scored 32/56 for Gerard balance scale. Patient at significant risk for fall.  PAIN MANAGMENT: Denies  DIET: Regular   BOWEL/BLADDER: Cont bladder; uses urinal. Incontinent of stool at times. Had bm x 2.  ABNL LAB/BG: Hgb 12.7  DRAIN/DEVICES: PIV SL  TELEMETRY RHYTHM: SB  SKIN: Scattered bruises, scab on left shin.  TESTS/PROCEDURES: None  D/C DAY/GOALS/PLACE: discharge to TCU pending. SW working on placement.  OTHER IMPORTANT INFO: PT following.

## 2023-10-12 NOTE — PLAN OF CARE
Goal Outcome Evaluation:       2011--1035  Diagnosis/Summary: altered mental status, infectious encephalopathy d/t COVID  Hx afib, cognitive impairment   Mental status: Alert and oriented X4, forgetful at times  Vitals/02: VSS except sllight hypertensive  Diet: Regular  Activity level: up with 1 gait belt and walker  Pain management : denies  GIGU: voided, urinal within reach. Incontinent at times  Skin: scattered bruises, pale  Other info: [employer], on tele-afib SB. PT following  Discharge: discharge pending TCU placement.

## 2023-10-12 NOTE — DISCHARGE SUMMARY
[facility]    Discharge Summary  Hospitalist    Date of Admission:  10/9/2023  Date of Discharge:  10/12/2023  Discharging Provider: Taylor Navarrete,     Discharge Diagnoses   Transient encephalopathy, now resolved, with concern for TIA vs dt heat exposure  Possible underlying mild cognitive impairment  COVID-19 infection  Atrial fibrillation, on chronic anticoagulation with DOAC  Hx of recent RLE hematoma, diagnosed in ED visit on 10/4/23  Hx narcolepsy  Glaucoma    History of Present Illness   Donald L Severson is a 83 year old male with PMHx of afib on anticoagulation with DOAC, narcolepsy, possible mild cog impairment who was admitted on 10/9/2023 with suspected infectious encephalopathy dt COVID19 associated infection     Hospital Course   Donald L Severson was admitted on 10/9/2023.  The following problems were addressed during his hospitalization:    Transient encephalopathy, now resolved, with concern for TIA vs dt heat exposure  *Limited HPI available on admission as patient was confused. Report was that he had gone to [employer] on the day of admission, family noticed he had been gone for awhile and they found him in his car confused and unable to speak. The heat was on and the car felt warm. Brought to ED for evaluation  *In ED, was febrile with T 101.2, VS otherwise stable on O2 sats stable on RA. Labs were generally unremarkable, though CRP elevated at 11.9. UA bland and not suggestive of infection (UC later grew urogenital cathy). CXR neg. Code stroke was called. Head CT and CTA head/neck were neg. Was found to be COVID+ on viral swab. Treatment not pursued dt stable respiratory status.   *Condition quickly improved -- mentating appropriately on 10/10, some mild situational confusion persisted (in setting of possible cog impairment as below) but no focal neurologic deficits.   *MRI brain neg for infarct.   *Clinical picture possibly suggestive heat exposure vs TIA in  setting of recently holding his anticoagulation for RLE hematoma? Discussed with stroke team, recommended resumption of DOAC for primary stroke prevention. Patient/family in agreement. DOAC resumed this stay.   *Remained afebrile during stay, blood cultures on admission remain neg  *Seen by PT, recommended discharge to TCU. Patient/daughter in agreement.  *Should follow up with PCP after discharge from TCU.     Possible underlying mild cognitive impairment  *Saw neurology earlier this year, manages with Aricept HS which has been continued.    COVID-19 infection  *With presentation/workup as above.  *No respiratory sx. No indication for specific treatment with steroids or remdesivir in absence of hypoxia.    Atrial fibrillation, on chronic anticoagulation with DOAC  Hx of recent RLE hematoma, diagnosed in ED visit on 10/4/23  *Was seen in ED on 10/4/23 for evaluation of RLE swelling, diagnosed with a hematoma and was told to stop his Xarelto for 1 wk.   *EKG on admission showed NSR with 1st degree AVB.   *DOAC resumed on 10/11 as above. Hgb stable at 12 this stay.     Hx narcolepsy  *Chronic and stable on modafinil (takes daily and prn at noon)    Glaucoma  *Chronic and stable on eye drops    Taylor Navarrete DO    Pending Results   These results will be followed up by PCP  Unresulted Labs Ordered in the Past 30 Days of this Admission       Date and Time Order Name Status Description    10/9/2023  5:44 PM Blood Culture Peripheral Blood Preliminary     10/9/2023  5:44 PM Blood Culture Peripheral Blood Preliminary             Code Status   Full Code       Primary Care Physician   Yanick Maria    Physical Exam   Temp: 98.8  F (37.1  C) Temp src: Oral BP: (!) 155/76 Pulse: 62   Resp: 16 SpO2: 96 % O2 Device: None (Room air)    Vitals:    10/09/23 1739 10/09/23 1821 10/10/23 0148   Weight: 84.2 kg (185 lb 10 oz) 84.2 kg (185 lb 10 oz) 83.3 kg (183 lb 10.3 oz)     Vital Signs with Ranges  Temp:  [97.7  F (36.5   C)-98.8  F (37.1  C)] 98.8  F (37.1  C)  Pulse:  [57-62] 62  Resp:  [16] 16  BP: (115-155)/(44-76) 155/76  SpO2:  [95 %-96 %] 96 %  I/O last 3 completed shifts:  In: 380 [P.O.:380]  Out: 510 [Urine:510]    General: Resting comfortably, alert, conversive, NAD  CVS: HRRR, no MGR, no LE edema  Respiratory: CTAB, no wheeze/rales/rhonchi, no increased work of breathing  GI: S, NT, ND, +BS  Skin: Warm/dry    Discharge Disposition   Discharged to TCU  Condition at discharge: Stable    Consultations This Hospital Stay   CARE MANAGEMENT / SOCIAL WORK IP CONSULT  PHYSICAL THERAPY ADULT IP CONSULT  OCCUPATIONAL THERAPY ADULT IP CONSULT    Time Spent on this Encounter   ITaylor DO, personally saw the patient today and spent greater than 30 minutes discharging this patient.    Discharge Orders      General info for SNF    Length of Stay Estimate: Short Term Care: Estimated # of Days <30  Condition at Discharge: Improving  Level of care:skilled   Rehabilitation Potential: Good  Admission H&P remains valid and up-to-date: Yes  Recent Chemotherapy: N/A  Use Nursing Home Standing Orders: Yes     Mantoux instructions    Give two-step Mantoux (PPD) Per Facility Policy Yes     Activity - Up with nursing assistance     Activity - Up with assistive device     Follow-up and recommended labs and tests     Follow up with facility physician in next 1-2 days, no labs needed     Reason for your hospital stay    Evaluation of you episode of confusion, which was thought to be possibly be due to either heat exposure or a mini stroke (as you recently had had to stop your blood thinner). Additionally, you were found to have COVID but had no symptoms and did not require any specific treatment.     Additional Discharge Instructions    Monitor RLE for worsening swelling or recurrence of hematoma (anticoagulation resumed this hospital stay)     Physical Therapy Adult Consult    Evaluate and treat as clinically indicated.    Reason:  Physical deconditioning     Occupational Therapy Adult Consult    Evaluate and treat as clinically indicated.    Reason:  Physical deconditioning, mild cognitive impairment     Diet    Follow this diet upon discharge: Regular     Discharge Medications   Current Discharge Medication List        START taking these medications    Details   loperamide (IMODIUM) 2 MG capsule Take 1 capsule (2 mg) by mouth 4 times daily as needed for diarrhea    Associated Diagnoses: Loose stools           CONTINUE these medications which have NOT CHANGED    Details   donepezil (ARICEPT) 5 MG tablet Take 10 mg by mouth at bedtime      latanoprost (XALATAN) 0.005 % ophthalmic solution Place 1 drop into both eyes at bedtime      !! modafinil (PROVIGIL) 200 MG tablet Take 200 mg by mouth daily      !! modafinil (PROVIGIL) 200 MG tablet Take 200 mg by mouth daily as needed (at noon for narcolepsy)      timolol maleate (TIMOPTIC) 0.5 % ophthalmic solution Place 1 drop into the right eye daily      rivaroxaban ANTICOAGULANT (XARELTO) 20 MG TABS tablet Take 20 mg by mouth daily (with dinner)       !! - Potential duplicate medications found. Please discuss with provider.        Allergies   No Known Allergies    Data   Most Recent 3 CBC's:  Recent Labs   Lab Test 10/11/23  0920 10/10/23  0840 10/09/23  1739   WBC 4.8 6.4 6.3   HGB 12.7* 12.9* 12.6*   MCV 95 96 95    222 228      Most Recent 3 BMP's:  Recent Labs   Lab Test 10/09/23  1739 06/28/23  1908    140   POTASSIUM 4.2 4.1   CHLORIDE 103 107   CO2 23 22   BUN 24.6* 25.8*   CR 0.99 0.90   ANIONGAP 11 11   RAJENDRA 8.7* 8.6*   * 146*     Results for orders placed or performed during the hospital encounter of 10/09/23   CT Head w/o Contrast    Narrative    EXAM: CT HEAD W/O CONTRAST  LOCATION: [facility]  DATE: 10/9/2023    INDICATION: Code Stroke to evaluate for potential thrombolysis and thrombectomy. PLEASE READ IMMEDIATELY. Confusion.  COMPARISON:  None.  TECHNIQUE: Routine CT Head without IV contrast. Multiplanar reformats. Dose reduction techniques were used.      Impression    IMPRESSION:  No evidence of hemorrhage. No convincing acute infarct. Volume loss and background of white matter hypoattenuation likely representing chronic small vessel ischemic change. No acute osseous abnormality. Nasal septal defect noted.    Findings were discussed by phone between Dr. Arreola and Dr. White at approximately 5:56 PM on 10/9/2023.   CTA Head Neck with Contrast    Narrative    EXAM: CTA HEAD NECK W CONTRAST  LOCATION: [facility]  DATE: 10/9/2023    INDICATION: Code Stroke to evaluate for potential thrombolysis and thrombectomy. PLEASE READ IMMEDIATELY. Confusion.  COMPARISON: CTA of the head and neck 06/28/2023.  CONTRAST: 67 mL [id number].  TECHNIQUE: Axial helical CT images of the head and neck vessels obtained during the arterial phase of intravenous contrast administration. Axial 2D reconstructed images and multiplanar 3D MIP reconstructed images of the head and neck vessels were   performed by the technologist. Dose reduction techniques were used. All stenosis measurements made according to NASCET criteria unless otherwise specified.    FINDINGS:   HEAD CTA:  No evidence of large vessel occlusion, high-grade stenosis, aneurysm, or vascular malformation. Incidental proximal basilar artery fenestration, unchanged. Tiny (2 mm or less) nonspecific outpouchings of the bilateral internal carotid arteries at the   supraclinoid segments, presumably incidental infundibula, unchanged.    NECK CTA:  No evidence of large vessel occlusion, high-grade stenosis, or dissection.    INCIDENTAL FINDINGS: Cervical spine degenerative changes with multiple stenoses. Presumed scarring/atelectasis at the lung apices. Apical calcified pleural plaquing. Nodule at the right lung apex measuring approximately 6 mm in diameter for which   optional outpatient chest  CT workup could be pursued if the patient is at high risk for a pulmonary malignancy, unchanged.      Impression    IMPRESSION:   HEAD CTA:   1.  No evidence of large vessel occlusion or high-grade stenosis.    NECK CTA:  1.  No evidence of large vessel occlusion or high-grade stenosis.   Chest XR,  PA & LAT    Narrative    EXAM: XR CHEST 2 VIEWS  LOCATION: [facility]  DATE: 10/9/2023    INDICATION: ams, fever  COMPARISON: None.      Impression    IMPRESSION: Negative chest.   MR Brain w/o & w Contrast    Narrative    EXAM: MR BRAIN W/O and W CONTRAST  LOCATION: [facility]  DATE: 10/10/2023    INDICATION: encephalopathy, concern for CVA  COMPARISON: 10/09/2023, 06/29/2023  CONTRAST: 9 mL Gadavist  TECHNIQUE: Routine multiplanar multisequence head MRI without and with intravenous contrast.    FINDINGS:  INTRACRANIAL CONTENTS: No acute or subacute infarct. No acute hemorrhage. Retrocerebellar arachnoid cyst. No adverse intracranial mass effect. Patchy nonspecific T2/FLAIR hyperintensities within the cerebral white matter most consistent with mild to   moderate chronic microvascular ischemic change. Stable central predominant volume loss and ventriculomegaly. Normal position of the cerebellar tonsils. No pathologic contrast enhancement.    SELLA: No abnormality accounting for technique.    OSSEOUS STRUCTURES/SOFT TISSUES: Normal marrow signal. The major intracranial vascular flow voids are maintained.     ORBITS: No abnormality accounting for technique.     SINUSES/MASTOIDS: Mild mucosal thickening scattered about the paranasal sinuses. No middle ear or mastoid effusion.       Impression    IMPRESSION:  1.  No acute intracranial process.  2.  Generalized brain atrophy and presumed microvascular ischemic changes as detailed above.

## 2023-10-12 NOTE — PROGRESS NOTES
Care Management Discharge Note    Discharge Date: 10/12/2023       Discharge Disposition: Home    Discharge Services: Other (see comment) (Home care RN/PT/OT)    Discharge DME: Walker    Discharge Transportation: family or friend will provide    Private pay costs discussed: transportation costs    Does the patient's insurance plan have a 3 day qualifying hospital stay waiver?  No    PAS Confirmation Code:  [id number]  Patient/family educated on Medicare website which has current facility and service quality ratings:      Education Provided on the Discharge Plan: Yes  Persons Notified of Discharge Plans: Patient/family  Patient/Family in Agreement with the Plan: other (see comments)    Handoff Referral Completed: No    Additional Information:  BETSEY received call form [facility] indicating she will transport at 11:45. SW canceled  stretcher transport. SW updated [facility] on transport time and they are in agreement. BETSEY completed Pas and faxed/added to chart. SW faxed discharge orders. Patient/family in agreement with discharge to [facility] via family at 1145 today.    PAS-RR    D: Per DHS regulation, BETSEY completed and submitted PAS-RR to [place] [employer] Direct Connect via the Senior LinkAge Line.  PAS-RR confirmation # is : [id number]    I: SW spoke with patient and they are aware a PAS-RR has been submitted.  SW reviewed with patient that they may be contacted for a follow up appointment within 10 days of hospital discharge if their SNF stay is < 30 days.  Contact information for [facility] LinkAge Line was also provided.    A: patient verbalized understanding.    P: Further questions may be directed to [facility] LinkAge Line at #[phone], option #4 for PAS-RR staff.    CESILIA Garcia    [facility]

## 2023-10-13 ENCOUNTER — TRANSITIONAL CARE UNIT VISIT (OUTPATIENT)
Dept: GERIATRICS | Facility: CLINIC | Age: 83
End: 2023-10-13
Payer: COMMERCIAL

## 2023-10-13 ENCOUNTER — PATIENT OUTREACH (OUTPATIENT)
Dept: CARE COORDINATION | Facility: CLINIC | Age: 83
End: 2023-10-13

## 2023-10-13 VITALS
OXYGEN SATURATION: 98 % | DIASTOLIC BLOOD PRESSURE: 52 MMHG | BODY MASS INDEX: 21.84 KG/M2 | SYSTOLIC BLOOD PRESSURE: 116 MMHG | HEART RATE: 62 BPM | TEMPERATURE: 97.7 F | HEIGHT: 77 IN | WEIGHT: 185 LBS | RESPIRATION RATE: 17 BRPM

## 2023-10-13 DIAGNOSIS — Z85.46 HX OF MALIGNANT NEOPLASM OF PROSTATE: ICD-10-CM

## 2023-10-13 DIAGNOSIS — G93.40 ENCEPHALOPATHY: Primary | ICD-10-CM

## 2023-10-13 DIAGNOSIS — Z79.01 ON CONTINUOUS ORAL ANTICOAGULATION: ICD-10-CM

## 2023-10-13 DIAGNOSIS — T14.8XXA HEMATOMA OF SKIN: ICD-10-CM

## 2023-10-13 DIAGNOSIS — I48.0 PAROXYSMAL ATRIAL FIBRILLATION (H): ICD-10-CM

## 2023-10-13 DIAGNOSIS — R26.9 ABNORMAL GAIT: ICD-10-CM

## 2023-10-13 DIAGNOSIS — U07.1 COVID-19: ICD-10-CM

## 2023-10-13 DIAGNOSIS — G47.419 PRIMARY NARCOLEPSY WITHOUT CATAPLEXY: ICD-10-CM

## 2023-10-13 DIAGNOSIS — H40.9 GLAUCOMA, UNSPECIFIED GLAUCOMA TYPE, UNSPECIFIED LATERALITY: ICD-10-CM

## 2023-10-13 DIAGNOSIS — R41.89 COGNITIVE IMPAIRMENT: ICD-10-CM

## 2023-10-13 PROBLEM — D22.9 ATYPICAL NEVUS: Status: ACTIVE | Noted: 2018-02-28

## 2023-10-13 PROBLEM — H40.1233 LOW-TENSION GLAUCOMA OF BOTH EYES, SEVERE STAGE: Status: ACTIVE | Noted: 2018-08-24

## 2023-10-13 PROBLEM — G31.84 MILD COGNITIVE IMPAIRMENT: Status: ACTIVE | Noted: 2023-06-12

## 2023-10-13 PROBLEM — R42 DIZZINESS: Status: ACTIVE | Noted: 2023-02-23

## 2023-10-13 PROBLEM — Z85.828 HISTORY OF BASAL CELL CARCINOMA: Status: ACTIVE | Noted: 2018-02-28

## 2023-10-13 PROBLEM — R07.89 ATYPICAL CHEST PAIN: Status: ACTIVE | Noted: 2023-02-23

## 2023-10-13 PROBLEM — K92.2 ACUTE LOWER GI BLEEDING: Status: ACTIVE | Noted: 2022-03-16

## 2023-10-13 PROBLEM — G47.429 NARCOLEPSY DUE TO UNDERLYING CONDITION WITHOUT CATAPLEXY: Status: ACTIVE | Noted: 2018-10-31

## 2023-10-13 PROBLEM — D64.9 ANEMIA: Status: ACTIVE | Noted: 2021-05-07

## 2023-10-13 PROBLEM — H25.813 COMBINED FORMS OF AGE-RELATED CATARACT OF BOTH EYES: Status: ACTIVE | Noted: 2018-08-24

## 2023-10-13 PROBLEM — K57.90 DIVERTICULOSIS: Status: ACTIVE | Noted: 2022-03-24

## 2023-10-13 PROBLEM — N20.1 URETERAL STONE: Status: ACTIVE | Noted: 2019-09-10

## 2023-10-13 PROBLEM — R73.03 PRE-DIABETES: Status: ACTIVE | Noted: 2022-03-24

## 2023-10-13 PROBLEM — R00.1 BRADYCARDIA: Status: ACTIVE | Noted: 2023-02-23

## 2023-10-13 PROCEDURE — 99310 SBSQ NF CARE HIGH MDM 45: CPT | Performed by: NURSE PRACTITIONER

## 2023-10-13 RX ORDER — MODAFINIL 200 MG/1
200 TABLET ORAL DAILY
Qty: 7 TABLET | Refills: 0 | Status: SHIPPED | OUTPATIENT
Start: 2023-10-13

## 2023-10-13 RX ORDER — MODAFINIL 200 MG/1
200 TABLET ORAL DAILY PRN
Qty: 7 TABLET | Refills: 0 | Status: SHIPPED | OUTPATIENT
Start: 2023-10-13

## 2023-10-13 NOTE — PROGRESS NOTES
[facility] Care Resource [place]    Background: Transitional Care Management program identified per system criteria and reviewed by [facility] team for possible outreach.    Assessment: Upon chart review, CCRC Team member will not proceed with patient outreach related to this episode of Transitional Care Management program due to reason below:    Non-[facility] TCU: CCRC team member noted patient discharged to TCU/ARU/LTACH. Patient is not established with a [facility] currently supported by Primary Care-Care Coordination therefore handoff to Primary Care-Care Coordination is not appropriate at this time.    Plan: Transitional Care Management episode addressed appropriately per reason noted above.      Jessica   [facility], [facility]    *Connected Care Resource Team does NOT follow patient ongoing. Referrals are identified based on internal discharge reports and the outreach is to ensure patient has an understanding of their discharge instructions.

## 2023-10-13 NOTE — LETTER
10/13/2023        RE: Donald L Severson  [address] [facility] [address]        [facility] GERIATRICS    PRIMARY CARE PROVIDER AND CLINIC:  Yanick Maria MD, [address] / [place] [address]  Chief Complaint   Patient presents with     Hospital F/U      [place] Medical Record Number:  [id number]  Place of Service where encounter took place:  [facility] (TCU) [[id number]]    Donald L Severson  is a 83 year old  (1940), admitted to the above facility from  [facility]. Hospital stay 10/9/2023 through 10/12/2023..       His past medical history includes  Possible cognitive impairment  Atrial fibrillation  Right lower extremity hematoma (10/4/2023)  Narcolepsy  Glaucoma  COVID-19 infection (10/2023)  Hx of prostate cancer in remission        On 10/4/2023, he was emergency room for right lower extremity swelling diagnosed with a hematoma and was holding his Xarelto for 1 week.      On 10/9/2023, patient was admitted with suspected infectious encephalopathy related to COVID-19 on the day of admission patient had gone to [employer] to get back on a while and his family found him in his car confused and unable to speak.  But he was on a cart felt warm.  He was brought to the emergency room for evaluation.  He was febrile with a temperature of 101.2 otherwise stable vital signs.  His CRP was 11.9.  A code stroke was called a head CT and CTA head/neck were negative.  He was found to be COVID positive.  Treatment was not pursued due to stable respiratory status.  His MRI for the brain was negative.      Today patient was seen in his room with his wife and daughter.   He denies any chest pain, shortness of breath, dizziness, lightheadedness, and a poor appetite.  Has not had an episode of non-responsiveness again.   Is working with therapy.  His daughter does not want him to be in the facility for 2-4 weeks as she feels this is too long.    "In reviewing point click care VS stable    CODE STATUS/ADVANCE DIRECTIVES DISCUSSION:  Prior  CPR/Full code   ALLERGIES:   Allergies   Allergen Reactions     Oxycodone Nausea     Oxycodone-Acetaminophen Nausea     Not severe, could try again.  May want      PAST MEDICAL HISTORY: No past medical history on file.   PAST SURGICAL HISTORY:   has no past surgical history on file.  FAMILY HISTORY: family history is not on file.  SOCIAL HISTORY:     Patient's living condition: lives with spouse    Post Discharge Medication Reconciliation Status:   MED REC REQUIRED  Post Medication Reconciliation Status:  Discharge medications reconciled, continue medications without change       Current Outpatient Medications   Medication Sig     donepezil (ARICEPT) 5 MG tablet Take 10 mg by mouth at bedtime     latanoprost (XALATAN) 0.005 % ophthalmic solution Place 1 drop into both eyes at bedtime     loperamide (IMODIUM) 2 MG capsule Take 1 capsule (2 mg) by mouth 4 times daily as needed for diarrhea     rivaroxaban ANTICOAGULANT (XARELTO) 20 MG TABS tablet Take 20 mg by mouth daily (with dinner)     timolol maleate (TIMOPTIC) 0.5 % ophthalmic solution Place 1 drop into the right eye daily     modafinil (PROVIGIL) 200 MG tablet Take 1 tablet (200 mg) by mouth daily     modafinil (PROVIGIL) 200 MG tablet Take 1 tablet (200 mg) by mouth daily as needed (at noon for narcolepsy) Can take in addition to scheduled morning dose.     No current facility-administered medications for this visit.       ROS:  4 point ROS including Respiratory, CV, GI and , other than that noted in the HPI,  is negative    Vitals:  /52   Pulse 62   Temp 97.7  F (36.5  C)   Resp 17   Ht 1.956 m (6' 5\")   Wt 83.9 kg (185 lb)   SpO2 98%   BMI 21.94 kg/m    Exam:  GENERAL APPEARANCE:  Alert, in no distress  NECK:  no noted abnormalities  RESP:  lungs clear to auscultation , no respiratory distress  PSYCH:  oriented X 3, normal insight, judgement and " memory    Lab/Diagnostic data:  Most Recent 3 CBC's:  Recent Labs   Lab Test 10/12/23  1103 10/11/23  0920 10/10/23  0840   WBC 4.1 4.8 6.4   HGB 13.6 12.7* 12.9*   MCV 96 95 96    201 222     Most Recent 3 BMP's:  Recent Labs   Lab Test 10/09/23  1739 06/28/23  1908    140   POTASSIUM 4.2 4.1   CHLORIDE 103 107   CO2 23 22   BUN 24.6* 25.8*   CR 0.99 0.90   ANIONGAP 11 11   RAJENDRA 8.7* 8.6*   * 146*       ASSESSMENT/PLAN:  (G93.40) Encephalopathy  (primary encounter diagnosis)  (U07.1) COVID-19  (R26.9) Abnormal gait  Comment: acute.  Was hospitalized for encephalopathy due to COVID 19.  This has resolved.  PT is using a walker and will work with therapy.   Plan: Continue with plan of care no changes at this time, adjustment as needed      (I48.0) Paroxysmal atrial fibrillation (H)  (Z79.01) On continuous oral anticoagulation  (T14.8XXA) Hematoma of skin  Comment: chronic.  Currently not on any AV node blockers.  Patient takes Xarelto 20 mg daily for CVA prophylaxis.  On 4/10/2023 his right leg was noted to be edematous due to hematoma.  His Xarelto was held for 7 days.  During that timeframe he developed encephalopathy.  He has not been restarted on his Xarelto.  His last hemoglobin was 13.6.  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  Follows with Dr. Ochoa at [facility].  Takes modafinil 200 mg daily.    Plan: Continue with plan of care no changes at this time, adjustment as needed      (H40.9) Glaucoma, unspecified glaucoma type, unspecified laterality  Comment: chronic.  Taking drops  Plan: Continue with plan of care no changes at this time, adjustment as needed      (Z85.46) Hx of malignant neoplasm of prostate  Comment: in remission  Plan: Continue with plan of care no changes at this time, adjustment as needed      (R41.89) cognitive impairment  Comment: He is neurology in June 2023 with started on ARicpet as a  A  [place] cognitive assessment performed in January of this year was borderline at 26/30.   He was seen by sleep medicine in July 2023 and recommended he stop the Aricept as starting this medication tracks with his dizziness. I have advised him to stop taking this medication as he is at increased risk for falling.   Plan:  will discuss discontinuing Aricept with pt and family        Electronically signed by:    BERENICE Licea CNP on 10/13/2023 at 7:55 PM      45 minutes was spent reviewing medical record from [facility], assessing patient, reviewing medical notes from previous care provider, talking with family and answering their questions/concerns, coordinating above plan of care with nursing , SW, therapy and dietitian and patient and reviewed medications with patient and counseled pt. on above plan of care.  Counseled on medications and side effects.                   Sincerely,        BERENICE Licea CNP

## 2023-10-13 NOTE — PLAN OF CARE
Physical Therapy Discharge Summary    Reason for therapy discharge:    Discharged to transitional care facility.    Progress towards therapy goal(s). See goals on Care Plan in [facility] health record for goal details.  Goals not met.  Barriers to achieving goals:   discharge from facility.    Therapy recommendation(s):    Continued therapy is recommended.  Rationale/Recommendations:  pt below baseline and has decreased activity tolerance. Recommend TCU to improve mobility.

## 2023-10-13 NOTE — PROGRESS NOTES
[facility] GERIATRICS    PRIMARY CARE PROVIDER AND CLINIC:  Yanick Maria MD, [address] / [place] [address]  Chief Complaint   Patient presents with    Hospital F/U      [place] Medical Record Number:  [id number]  Place of Service where encounter took place:  [facility] (TCU) [[id number]]    Donald L Severson  is a 83 year old  (1940), admitted to the above facility from  [facility]. Hospital stay 10/9/2023 through 10/12/2023..       His past medical history includes  Possible cognitive impairment  Atrial fibrillation  Right lower extremity hematoma (10/4/2023)  Narcolepsy  Glaucoma  COVID-19 infection (10/2023)  Hx of prostate cancer in remission        On 10/4/2023, he was emergency room for right lower extremity swelling diagnosed with a hematoma and was holding his Xarelto for 1 week.      On 10/9/2023, patient was admitted with suspected infectious encephalopathy related to COVID-19 on the day of admission patient had gone to [employer] to get back on a while and his family found him in his car confused and unable to speak.  But he was on a cart felt warm.  He was brought to the emergency room for evaluation.  He was febrile with a temperature of 101.2 otherwise stable vital signs.  His CRP was 11.9.  A code stroke was called a head CT and CTA head/neck were negative.  He was found to be COVID positive.  Treatment was not pursued due to stable respiratory status.  His MRI for the brain was negative.      Today patient was seen in his room with his wife and daughter.   He denies any chest pain, shortness of breath, dizziness, lightheadedness, and a poor appetite.  Has not had an episode of non-responsiveness again.   Is working with therapy.  His daughter does not want him to be in the facility for 2-4 weeks as she feels this is too long.   In reviewing point click care VS stable    CODE STATUS/ADVANCE DIRECTIVES DISCUSSION:  Prior   "CPR/Full code   ALLERGIES:   Allergies   Allergen Reactions    Oxycodone Nausea    Oxycodone-Acetaminophen Nausea     Not severe, could try again.  May want      PAST MEDICAL HISTORY: No past medical history on file.   PAST SURGICAL HISTORY:   has no past surgical history on file.  FAMILY HISTORY: family history is not on file.  SOCIAL HISTORY:     Patient's living condition: lives with spouse    Post Discharge Medication Reconciliation Status:   MED REC REQUIRED  Post Medication Reconciliation Status:  Discharge medications reconciled, continue medications without change       Current Outpatient Medications   Medication Sig    donepezil (ARICEPT) 5 MG tablet Take 10 mg by mouth at bedtime    latanoprost (XALATAN) 0.005 % ophthalmic solution Place 1 drop into both eyes at bedtime    loperamide (IMODIUM) 2 MG capsule Take 1 capsule (2 mg) by mouth 4 times daily as needed for diarrhea    rivaroxaban ANTICOAGULANT (XARELTO) 20 MG TABS tablet Take 20 mg by mouth daily (with dinner)    timolol maleate (TIMOPTIC) 0.5 % ophthalmic solution Place 1 drop into the right eye daily    modafinil (PROVIGIL) 200 MG tablet Take 1 tablet (200 mg) by mouth daily    modafinil (PROVIGIL) 200 MG tablet Take 1 tablet (200 mg) by mouth daily as needed (at noon for narcolepsy) Can take in addition to scheduled morning dose.     No current facility-administered medications for this visit.       ROS:  4 point ROS including Respiratory, CV, GI and , other than that noted in the HPI,  is negative    Vitals:  /52   Pulse 62   Temp 97.7  F (36.5  C)   Resp 17   Ht 1.956 m (6' 5\")   Wt 83.9 kg (185 lb)   SpO2 98%   BMI 21.94 kg/m    Exam:  GENERAL APPEARANCE:  Alert, in no distress  NECK:  no noted abnormalities  RESP:  lungs clear to auscultation , no respiratory distress  PSYCH:  oriented X 3, normal insight, judgement and memory    Lab/Diagnostic data:  Most Recent 3 CBC's:  Recent Labs   Lab Test 10/12/23  1103 10/11/23  0920 " 10/10/23  0840   WBC 4.1 4.8 6.4   HGB 13.6 12.7* 12.9*   MCV 96 95 96    201 222     Most Recent 3 BMP's:  Recent Labs   Lab Test 10/09/23  1739 06/28/23  1908    140   POTASSIUM 4.2 4.1   CHLORIDE 103 107   CO2 23 22   BUN 24.6* 25.8*   CR 0.99 0.90   ANIONGAP 11 11   RAJENDRA 8.7* 8.6*   * 146*       ASSESSMENT/PLAN:  (G93.40) Encephalopathy  (primary encounter diagnosis)  (U07.1) COVID-19  (R26.9) Abnormal gait  Comment: acute.  Was hospitalized for encephalopathy due to COVID 19.  This has resolved.  PT is using a walker and will work with therapy.   Plan: Continue with plan of care no changes at this time, adjustment as needed      (I48.0) Paroxysmal atrial fibrillation (H)  (Z79.01) On continuous oral anticoagulation  (T14.8XXA) Hematoma of skin  Comment: chronic.  Currently not on any AV node blockers.  Patient takes Xarelto 20 mg daily for CVA prophylaxis.  On 4/10/2023 his right leg was noted to be edematous due to hematoma.  His Xarelto was held for 7 days.  During that timeframe he developed encephalopathy.  He has not been restarted on his Xarelto.  His last hemoglobin was 13.6.  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  Follows with Dr. Ochoa at [facility].  Takes modafinil 200 mg daily.    Plan: Continue with plan of care no changes at this time, adjustment as needed      (H40.9) Glaucoma, unspecified glaucoma type, unspecified laterality  Comment: chronic.  Taking drops  Plan: Continue with plan of care no changes at this time, adjustment as needed      (Z85.46) Hx of malignant neoplasm of prostate  Comment: in remission  Plan: Continue with plan of care no changes at this time, adjustment as needed      (R41.89) cognitive impairment  Comment: He is neurology in June 2023 with started on ARicpet as a  A [place] cognitive assessment performed in January of this year was borderline at 26/30.   He was seen by  sleep medicine in July 2023 and recommended he stop the Aricept as starting this medication tracks with his dizziness. I have advised him to stop taking this medication as he is at increased risk for falling.   Plan:  will discuss discontinuing Aricept with pt and family        Electronically signed by:    BERENICE Licea CNP on 10/13/2023 at 7:55 PM      45 minutes was spent reviewing medical record from [facility], assessing patient, reviewing medical notes from previous care provider, talking with family and answering their questions/concerns, coordinating above plan of care with nursing , SW, therapy and dietitian and patient and reviewed medications with patient and counseled pt. on above plan of care.  Counseled on medications and side effects.

## 2023-10-14 LAB
BACTERIA BLD CULT: NO GROWTH
BACTERIA BLD CULT: NO GROWTH

## 2023-10-16 ENCOUNTER — TRANSITIONAL CARE UNIT VISIT (OUTPATIENT)
Dept: GERIATRICS | Facility: CLINIC | Age: 83
End: 2023-10-16
Payer: COMMERCIAL

## 2023-10-16 VITALS
BODY MASS INDEX: 21.84 KG/M2 | SYSTOLIC BLOOD PRESSURE: 118 MMHG | HEIGHT: 77 IN | HEART RATE: 62 BPM | WEIGHT: 185 LBS | DIASTOLIC BLOOD PRESSURE: 62 MMHG | RESPIRATION RATE: 17 BRPM | OXYGEN SATURATION: 98 % | TEMPERATURE: 97.7 F

## 2023-10-16 DIAGNOSIS — R26.9 ABNORMAL GAIT: ICD-10-CM

## 2023-10-16 DIAGNOSIS — H40.9 GLAUCOMA, UNSPECIFIED GLAUCOMA TYPE, UNSPECIFIED LATERALITY: ICD-10-CM

## 2023-10-16 DIAGNOSIS — U07.1 COVID-19: ICD-10-CM

## 2023-10-16 DIAGNOSIS — R41.89 COGNITIVE IMPAIRMENT: ICD-10-CM

## 2023-10-16 DIAGNOSIS — Z85.46 HX OF MALIGNANT NEOPLASM OF PROSTATE: ICD-10-CM

## 2023-10-16 DIAGNOSIS — G47.419 PRIMARY NARCOLEPSY WITHOUT CATAPLEXY: ICD-10-CM

## 2023-10-16 DIAGNOSIS — T14.8XXA HEMATOMA OF SKIN: ICD-10-CM

## 2023-10-16 DIAGNOSIS — Z79.01 ON CONTINUOUS ORAL ANTICOAGULATION: ICD-10-CM

## 2023-10-16 DIAGNOSIS — I48.0 PAROXYSMAL ATRIAL FIBRILLATION (H): ICD-10-CM

## 2023-10-16 DIAGNOSIS — G93.40 ENCEPHALOPATHY: Primary | ICD-10-CM

## 2023-10-16 PROCEDURE — 99316 NF DSCHRG MGMT 30 MIN+: CPT | Performed by: NURSE PRACTITIONER

## 2023-10-16 NOTE — LETTER
10/16/2023        RE: Donald L Severson  [address] [facility] [address]        [facility] GERIATRICS DISCHARGE SUMMARY  PATIENT'S NAME: Donald L Severson  YOB: 1940  MEDICAL RECORD NUMBER:  [id number]  Place of Service where encounter took place:  [facility] (TCU) [[id number]]    PRIMARY CARE PROVIDER AND CLINIC RESPONSIBLE AFTER TRANSFER:   Yanick Maria MD, [address] / [place] [address]    Non-G Provider     Transferring providers: BERENICE Licea CNP, Dr. Cynthia Johnson MD  Recent Hospitalization/ED: [facility]. Hospital stay 10/9/2023 through 10/12/2023..    Date of SNF Admission: 10/12/2023  Date of SNF (anticipated) Discharge: 10/19/23   Discharged to: previous independent home  DME: No new DME needed    CODE STATUS/ADVANCE DIRECTIVES DISCUSSION:  Prior  ALLERGIES: Oxycodone and Oxycodone-acetaminophen    On 10/9/2023, patient was admitted with suspected infectious encephalopathy related to COVID-19 on the day of admission patient had gone to [employer] to get back on a while and his family found him in his car confused and unable to speak.  But he was on a cart felt warm.  He was brought to the emergency room for evaluation.  He was febrile with a temperature of 101.2 otherwise stable vital signs.  His CRP was 11.9.  A code stroke was called a head CT and CTA head/neck were negative.  He was found to be COVID positive.  Treatment was not pursued due to stable respiratory status.  His MRI for the brain was negative.     NURSING FACILITY COURSE   Medication Changes/Rationale:   Summary of nursing facility stay:   (G93.40) Encephalopathy  (primary encounter diagnosis)  (U07.1) COVID-19  (R26.9) Abnormal gait  Comment: acute.  Was hospitalized for encephalopathy due to COVID 19.  This has resolved.  PT worked with therapy.      (I48.0) Paroxysmal atrial fibrillation (H)  (Z79.01) On continuous oral  anticoagulation  (T14.8XXA) Hematoma of skin  Comment: chronic.  Currently not on any AV node blockers.  Patient takes Xarelto 20 mg daily for CVA prophylaxis.  On 4/10/2023 his right leg was noted to be edematous due to hematoma.  His Xarelto was held for 7 days.  During that timeframe he developed encephalopathy.  He has not been restarted on his Xarelto.  His last hemoglobin was 13.6.    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  Follows with Dr. Ochoa at [facility].  Takes modafinil 200 mg daily.      (H40.9) Glaucoma, unspecified glaucoma type, unspecified laterality  Comment: chronic.  Taking drops    (Z85.46) Hx of malignant neoplasm of prostate  Comment: in remission    (R41.89) cognitive impairment  Comment: He is neurology in June 2023 with started on Aricept as a  A [place] cognitive assessment performed in January of this year was borderline at 26/30.   He was seen by sleep medicine in July 2023 and recommended he stop the Aricept as starting this medication tracks with his dizziness. I have advised him to stop taking this medication as he is at increased risk for falling.  He continues to take this medications             Discharge Medications:  MED REC REQUIRED  Post Medication Reconciliation Status:  Discharge medications reconciled, continue medications without change       Current Outpatient Medications   Medication Sig Dispense Refill     donepezil (ARICEPT) 5 MG tablet Take 10 mg by mouth at bedtime       latanoprost (XALATAN) 0.005 % ophthalmic solution Place 1 drop into both eyes at bedtime       loperamide (IMODIUM) 2 MG capsule Take 1 capsule (2 mg) by mouth 4 times daily as needed for diarrhea       modafinil (PROVIGIL) 200 MG tablet Take 1 tablet (200 mg) by mouth daily 7 tablet 0     modafinil (PROVIGIL) 200 MG tablet Take 1 tablet (200 mg) by mouth daily as needed (at noon for narcolepsy) Can take in addition to scheduled morning dose. 7 tablet 0      "rivaroxaban ANTICOAGULANT (XARELTO) 20 MG TABS tablet Take 20 mg by mouth daily (with dinner)       timolol maleate (TIMOPTIC) 0.5 % ophthalmic solution Place 1 drop into the right eye daily            Controlled medications:   not applicable/none     Past Medical History: No past medical history on file.  Physical Exam:   Vitals: /62   Pulse 62   Temp 97.7  F (36.5  C)   Resp 17   Ht 1.956 m (6' 5\")   Wt 83.9 kg (185 lb)   SpO2 98%   BMI 21.94 kg/m    BMI: Body mass index is 21.94 kg/m .  GENERAL APPEARANCE:  Alert, in no distress  RESP:  respiratory effort and palpation of chest normal, lungs clear to auscultation   CV:  Palpation and auscultation of heart done , rate-normal  PSYCH:  oriented X 3     SNF labs:     Most Recent 3 CBC's:  Recent Labs   Lab Test 10/18/23  0645 10/12/23  1103 10/11/23  0920   WBC 6.8 4.1 4.8   HGB 11.9* 13.6 12.7*   MCV 95 96 95    209 201     Most Recent 3 BMP's:  Recent Labs   Lab Test 10/18/23  0645 10/09/23  1739 06/28/23  1908    137 140   POTASSIUM 4.1 4.2 4.1   CHLORIDE 106 103 107   CO2 24 23 22   BUN 21.0 24.6* 25.8*   CR 0.92 0.99 0.90   ANIONGAP 10 11 11   RAJENDRA 8.4* 8.7* 8.6*   GLC 82 111* 146*       DISCHARGE PLAN:  Follow up labs: No labs orders/due  Medical Follow Up:      Follow up with primary care provider in 1-2 weeks  [facility] scheduled appointments: none  Discharge Services: Home Care:  Occupational Therapy, Physical Therapy, Registered Nurse, and Home Health Aide  Discharge Instructions Verbalized to Patient at Discharge:   None    TOTAL DISCHARGE TIME:   Greater than 30 minutes  Electronically signed by:  BERENICE Licea CNP, APRN CNP on 10/16/2023 at 8:12 AM      Documentation of Face to Face and Certification for Home Health Services        I certify that patient: Donald L Severson is under my care and that I,  had a face-to-face encounter that meets the physician face-to-face encounter requirements " with this patient on: 10/16/23  .        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 and Physical Therapy Services are needed to assess and treat the following functional impairments: movement.   .       BERENICE Licea CNP on 10/16/2023 at 8:12 AM                 Sincerely,        BERENICE Licea CNP

## 2023-10-16 NOTE — PROGRESS NOTES
[facility] GERIATRICS DISCHARGE SUMMARY  PATIENT'S NAME: Donald L Severson  YOB: 1940  MEDICAL RECORD NUMBER:  [id number]  Place of Service where encounter took place:  [facility] (TCU) [[id number]]    PRIMARY CARE PROVIDER AND CLINIC RESPONSIBLE AFTER TRANSFER:   Yanick Maria MD, [address] / [place] [address]    Non-[facility] Provider     Transferring providers: BERENICE Licea CNP, Dr. Cynthia Johnson MD  Recent Hospitalization/ED: [facility]. Hospital stay 10/9/2023 through 10/12/2023..    Date of SNF Admission: 10/12/2023  Date of SNF (anticipated) Discharge: 10/19/23   Discharged to: previous independent home  DME: No new DME needed    CODE STATUS/ADVANCE DIRECTIVES DISCUSSION:  Prior  ALLERGIES: Oxycodone and Oxycodone-acetaminophen    On 10/9/2023, patient was admitted with suspected infectious encephalopathy related to COVID-19 on the day of admission patient had gone to [facility] to get back on a while and his family found him in his car confused and unable to speak.  But he was on a cart felt warm.  He was brought to the emergency room for evaluation.  He was febrile with a temperature of 101.2 otherwise stable vital signs.  His CRP was 11.9.  A code stroke was called a head CT and CTA head/neck were negative.  He was found to be COVID positive.  Treatment was not pursued due to stable respiratory status.  His MRI for the brain was negative.     NURSING FACILITY COURSE   Medication Changes/Rationale:   Summary of nursing facility stay:   (G93.40) Encephalopathy  (primary encounter diagnosis)  (U07.1) COVID-19  (R26.9) Abnormal gait  Comment: acute.  Was hospitalized for encephalopathy due to COVID 19.  This has resolved.  PT worked with therapy.      (I48.0) Paroxysmal atrial fibrillation (H)  (Z79.01) On continuous oral anticoagulation  (T14.8XXA) Hematoma of skin  Comment: chronic.  Currently not on any AV node  blockers.  Patient takes Xarelto 20 mg daily for CVA prophylaxis.  On 4/10/2023 his right leg was noted to be edematous due to hematoma.  His Xarelto was held for 7 days.  During that timeframe he developed encephalopathy.  He has not been restarted on his Xarelto.  His last hemoglobin was 13.6.    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  Follows with Dr. Ochoa at [facility].  Takes modafinil 200 mg daily.      (H40.9) Glaucoma, unspecified glaucoma type, unspecified laterality  Comment: chronic.  Taking drops    (Z85.46) Hx of malignant neoplasm of prostate  Comment: in remission    (R41.89) cognitive impairment  Comment: He is neurology in June 2023 with started on Aricept as a  A [place] cognitive assessment performed in January of this year was borderline at 26/30.   He was seen by sleep medicine in July 2023 and recommended he stop the Aricept as starting this medication tracks with his dizziness. I have advised him to stop taking this medication as he is at increased risk for falling.  He continues to take this medications             Discharge Medications:  MED REC REQUIRED  Post Medication Reconciliation Status:  Discharge medications reconciled, continue medications without change       Current Outpatient Medications   Medication Sig Dispense Refill    donepezil (ARICEPT) 5 MG tablet Take 10 mg by mouth at bedtime      latanoprost (XALATAN) 0.005 % ophthalmic solution Place 1 drop into both eyes at bedtime      loperamide (IMODIUM) 2 MG capsule Take 1 capsule (2 mg) by mouth 4 times daily as needed for diarrhea      modafinil (PROVIGIL) 200 MG tablet Take 1 tablet (200 mg) by mouth daily 7 tablet 0    modafinil (PROVIGIL) 200 MG tablet Take 1 tablet (200 mg) by mouth daily as needed (at noon for narcolepsy) Can take in addition to scheduled morning dose. 7 tablet 0    rivaroxaban ANTICOAGULANT (XARELTO) 20 MG TABS tablet Take 20 mg by mouth daily (with dinner)      timolol  "maleate (TIMOPTIC) 0.5 % ophthalmic solution Place 1 drop into the right eye daily            Controlled medications:   not applicable/none     Past Medical History: No past medical history on file.  Physical Exam:   Vitals: /62   Pulse 62   Temp 97.7  F (36.5  C)   Resp 17   Ht 1.956 m (6' 5\")   Wt 83.9 kg (185 lb)   SpO2 98%   BMI 21.94 kg/m    BMI: Body mass index is 21.94 kg/m .  GENERAL APPEARANCE:  Alert, in no distress  RESP:  respiratory effort and palpation of chest normal, lungs clear to auscultation   CV:  Palpation and auscultation of heart done , rate-normal  PSYCH:  oriented X 3     SNF labs:     Most Recent 3 CBC's:  Recent Labs   Lab Test 10/18/23  0645 10/12/23  1103 10/11/23  0920   WBC 6.8 4.1 4.8   HGB 11.9* 13.6 12.7*   MCV 95 96 95    209 201     Most Recent 3 BMP's:  Recent Labs   Lab Test 10/18/23  0645 10/09/23  1739 06/28/23  1908    137 140   POTASSIUM 4.1 4.2 4.1   CHLORIDE 106 103 107   CO2 24 23 22   BUN 21.0 24.6* 25.8*   CR 0.92 0.99 0.90   ANIONGAP 10 11 11   RAJENDRA 8.4* 8.7* 8.6*   GLC 82 111* 146*       DISCHARGE PLAN:  Follow up labs: No labs orders/due  Medical Follow Up:      Follow up with primary care provider in 1-2 weeks  [facility] scheduled appointments: none  Discharge Services: Home Care:  Occupational Therapy, Physical Therapy, Registered Nurse, and Home Health Aide  Discharge Instructions Verbalized to Patient at Discharge:   None    TOTAL DISCHARGE TIME:   Greater than 30 minutes  Electronically signed by:  BERENICE Licea CNP, APRN CNP on 10/16/2023 at 8:12 AM      Documentation of Face to Face and Certification for Home Health Services        I certify that patient: Donald L Severson is under my care and that I,  had a face-to-face encounter that meets the physician face-to-face encounter requirements with this patient on: 10/16/23  .        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 and Physical Therapy Services are needed to assess and treat the following functional impairments: movement.   .       BERENICE Licea CNP on 10/16/2023 at 8:12 AM

## 2023-10-17 ENCOUNTER — LAB REQUISITION (OUTPATIENT)
Dept: LAB | Facility: CLINIC | Age: 83
End: 2023-10-17
Payer: COMMERCIAL

## 2023-10-17 DIAGNOSIS — G47.421 NARCOLEPSY IN CONDITIONS CLASSIFIED ELSEWHERE WITH CATAPLEXY: ICD-10-CM

## 2023-10-18 LAB
ANION GAP SERPL CALCULATED.3IONS-SCNC: 10 MMOL/L (ref 7–15)
BUN SERPL-MCNC: 21 MG/DL (ref 8–23)
CALCIUM SERPL-MCNC: 8.4 MG/DL (ref 8.8–10.2)
CHLORIDE SERPL-SCNC: 106 MMOL/L (ref 98–107)
CREAT SERPL-MCNC: 0.92 MG/DL (ref 0.67–1.17)
DEPRECATED HCO3 PLAS-SCNC: 24 MMOL/L (ref 22–29)
EGFRCR SERPLBLD CKD-EPI 2021: 84 ML/MIN/1.73M2
ERYTHROCYTE [DISTWIDTH] IN BLOOD BY AUTOMATED COUNT: 12.3 % (ref 10–15)
GLUCOSE SERPL-MCNC: 82 MG/DL (ref 70–99)
HCT VFR BLD AUTO: 36.5 % (ref 40–53)
HGB BLD-MCNC: 11.9 G/DL (ref 13.3–17.7)
MAGNESIUM SERPL-MCNC: 2.3 MG/DL (ref 1.7–2.3)
MCH RBC QN AUTO: 31.1 PG (ref 26.5–33)
MCHC RBC AUTO-ENTMCNC: 32.6 G/DL (ref 31.5–36.5)
MCV RBC AUTO: 95 FL (ref 78–100)
PLATELET # BLD AUTO: 201 10E3/UL (ref 150–450)
POTASSIUM SERPL-SCNC: 4.1 MMOL/L (ref 3.4–5.3)
RBC # BLD AUTO: 3.83 10E6/UL (ref 4.4–5.9)
SODIUM SERPL-SCNC: 140 MMOL/L (ref 135–145)
WBC # BLD AUTO: 6.8 10E3/UL (ref 4–11)

## 2023-10-18 PROCEDURE — 36415 COLL VENOUS BLD VENIPUNCTURE: CPT | Mod: ORL | Performed by: NURSE PRACTITIONER

## 2023-10-18 PROCEDURE — 85027 COMPLETE CBC AUTOMATED: CPT | Mod: ORL | Performed by: NURSE PRACTITIONER

## 2023-10-18 PROCEDURE — 80048 BASIC METABOLIC PNL TOTAL CA: CPT | Mod: ORL | Performed by: NURSE PRACTITIONER

## 2023-10-18 PROCEDURE — P9604 ONE-WAY ALLOW PRORATED TRIP: HCPCS | Mod: ORL | Performed by: NURSE PRACTITIONER

## 2023-10-18 PROCEDURE — 83735 ASSAY OF MAGNESIUM: CPT | Mod: ORL | Performed by: NURSE PRACTITIONER
